# Patient Record
Sex: MALE | Race: BLACK OR AFRICAN AMERICAN | Employment: UNEMPLOYED | URBAN - METROPOLITAN AREA
[De-identification: names, ages, dates, MRNs, and addresses within clinical notes are randomized per-mention and may not be internally consistent; named-entity substitution may affect disease eponyms.]

---

## 2019-06-03 ENCOUNTER — HOSPITAL ENCOUNTER (EMERGENCY)
Age: 34
Discharge: HOME | End: 2019-06-03
Payer: MEDICAID

## 2019-06-03 ENCOUNTER — APPOINTMENT (OUTPATIENT)
Dept: CT IMAGING | Age: 34
End: 2019-06-03
Payer: MEDICAID

## 2019-06-03 VITALS
DIASTOLIC BLOOD PRESSURE: 82 MMHG | OXYGEN SATURATION: 98 % | BODY MASS INDEX: 42.48 KG/M2 | SYSTOLIC BLOOD PRESSURE: 134 MMHG | TEMPERATURE: 98.2 F | HEART RATE: 93 BPM | WEIGHT: 296.74 LBS | HEIGHT: 70 IN | RESPIRATION RATE: 16 BRPM

## 2019-06-03 DIAGNOSIS — R10.9 ABDOMINAL PAIN, UNSPECIFIED ABDOMINAL LOCATION: Primary | ICD-10-CM

## 2019-06-03 LAB
A/G RATIO: 1.2 (ref 1.1–2.2)
ALBUMIN SERPL-MCNC: 4.3 G/DL (ref 3.4–5)
ALP BLD-CCNC: 66 U/L (ref 40–129)
ALT SERPL-CCNC: 17 U/L (ref 10–40)
ANION GAP SERPL CALCULATED.3IONS-SCNC: 11 MMOL/L (ref 3–16)
AST SERPL-CCNC: 36 U/L (ref 15–37)
BASOPHILS ABSOLUTE: 0 K/UL (ref 0–0.2)
BASOPHILS RELATIVE PERCENT: 0.4 %
BILIRUB SERPL-MCNC: 0.4 MG/DL (ref 0–1)
BILIRUBIN URINE: NEGATIVE
BLOOD, URINE: ABNORMAL
BUN BLDV-MCNC: 8 MG/DL (ref 7–20)
CALCIUM SERPL-MCNC: 9.3 MG/DL (ref 8.3–10.6)
CHLORIDE BLD-SCNC: 99 MMOL/L (ref 99–110)
CLARITY: CLEAR
CO2: 23 MMOL/L (ref 21–32)
COLOR: YELLOW
CREAT SERPL-MCNC: 0.9 MG/DL (ref 0.9–1.3)
EOSINOPHILS ABSOLUTE: 0.2 K/UL (ref 0–0.6)
EOSINOPHILS RELATIVE PERCENT: 1.9 %
EPITHELIAL CELLS, UA: ABNORMAL /HPF
GFR AFRICAN AMERICAN: >60
GFR NON-AFRICAN AMERICAN: >60
GLOBULIN: 3.5 G/DL
GLUCOSE BLD-MCNC: 132 MG/DL (ref 70–99)
GLUCOSE URINE: NEGATIVE MG/DL
HCT VFR BLD CALC: 43.9 % (ref 40.5–52.5)
HEMOGLOBIN: 15.1 G/DL (ref 13.5–17.5)
KETONES, URINE: NEGATIVE MG/DL
LACTIC ACID: 1.1 MMOL/L (ref 0.4–2)
LEUKOCYTE ESTERASE, URINE: NEGATIVE
LIPASE: 19 U/L (ref 13–60)
LYMPHOCYTES ABSOLUTE: 1.2 K/UL (ref 1–5.1)
LYMPHOCYTES RELATIVE PERCENT: 12.8 %
MCH RBC QN AUTO: 33.4 PG (ref 26–34)
MCHC RBC AUTO-ENTMCNC: 34.3 G/DL (ref 31–36)
MCV RBC AUTO: 97.2 FL (ref 80–100)
MICROSCOPIC EXAMINATION: YES
MONOCYTES ABSOLUTE: 1 K/UL (ref 0–1.3)
MONOCYTES RELATIVE PERCENT: 10 %
NEUTROPHILS ABSOLUTE: 7.1 K/UL (ref 1.7–7.7)
NEUTROPHILS RELATIVE PERCENT: 74.9 %
NITRITE, URINE: NEGATIVE
PDW BLD-RTO: 13.4 % (ref 12.4–15.4)
PH UA: 6 (ref 5–8)
PLATELET # BLD: 242 K/UL (ref 135–450)
PMV BLD AUTO: 7.5 FL (ref 5–10.5)
POTASSIUM SERPL-SCNC: 3.9 MMOL/L (ref 3.5–5.1)
POTASSIUM SERPL-SCNC: 6.1 MMOL/L (ref 3.5–5.1)
PROTEIN UA: NEGATIVE MG/DL
RBC # BLD: 4.52 M/UL (ref 4.2–5.9)
RBC UA: ABNORMAL /HPF (ref 0–2)
SODIUM BLD-SCNC: 133 MMOL/L (ref 136–145)
SPECIFIC GRAVITY UA: <=1.005 (ref 1–1.03)
TOTAL PROTEIN: 7.8 G/DL (ref 6.4–8.2)
URINE REFLEX TO CULTURE: ABNORMAL
URINE TYPE: ABNORMAL
UROBILINOGEN, URINE: 0.2 E.U./DL
WBC # BLD: 9.5 K/UL (ref 4–11)
WBC UA: ABNORMAL /HPF (ref 0–5)

## 2019-06-03 PROCEDURE — 83690 ASSAY OF LIPASE: CPT

## 2019-06-03 PROCEDURE — 36415 COLL VENOUS BLD VENIPUNCTURE: CPT

## 2019-06-03 PROCEDURE — 74177 CT ABD & PELVIS W/CONTRAST: CPT

## 2019-06-03 PROCEDURE — 6360000004 HC RX CONTRAST MEDICATION: Performed by: NURSE PRACTITIONER

## 2019-06-03 PROCEDURE — 6360000002 HC RX W HCPCS: Performed by: NURSE PRACTITIONER

## 2019-06-03 PROCEDURE — 83605 ASSAY OF LACTIC ACID: CPT

## 2019-06-03 PROCEDURE — 99284 EMERGENCY DEPT VISIT MOD MDM: CPT

## 2019-06-03 PROCEDURE — 80053 COMPREHEN METABOLIC PANEL: CPT

## 2019-06-03 PROCEDURE — 2580000003 HC RX 258: Performed by: NURSE PRACTITIONER

## 2019-06-03 PROCEDURE — 96375 TX/PRO/DX INJ NEW DRUG ADDON: CPT

## 2019-06-03 PROCEDURE — 84132 ASSAY OF SERUM POTASSIUM: CPT

## 2019-06-03 PROCEDURE — 96361 HYDRATE IV INFUSION ADD-ON: CPT

## 2019-06-03 PROCEDURE — 81001 URINALYSIS AUTO W/SCOPE: CPT

## 2019-06-03 PROCEDURE — 85025 COMPLETE CBC W/AUTO DIFF WBC: CPT

## 2019-06-03 PROCEDURE — 96374 THER/PROPH/DIAG INJ IV PUSH: CPT

## 2019-06-03 RX ORDER — KETOROLAC TROMETHAMINE 30 MG/ML
15 INJECTION, SOLUTION INTRAMUSCULAR; INTRAVENOUS ONCE
Status: COMPLETED | OUTPATIENT
Start: 2019-06-03 | End: 2019-06-03

## 2019-06-03 RX ORDER — 0.9 % SODIUM CHLORIDE 0.9 %
1000 INTRAVENOUS SOLUTION INTRAVENOUS ONCE
Status: COMPLETED | OUTPATIENT
Start: 2019-06-03 | End: 2019-06-03

## 2019-06-03 RX ORDER — DICYCLOMINE HCL 20 MG
20 TABLET ORAL 4 TIMES DAILY
Qty: 30 TABLET | Refills: 0 | Status: SHIPPED | OUTPATIENT
Start: 2019-06-03 | End: 2020-09-16 | Stop reason: ALTCHOICE

## 2019-06-03 RX ORDER — NAPROXEN 500 MG/1
500 TABLET ORAL 2 TIMES DAILY
Qty: 20 TABLET | Refills: 0 | Status: SHIPPED | OUTPATIENT
Start: 2019-06-03 | End: 2020-09-16 | Stop reason: ALTCHOICE

## 2019-06-03 RX ORDER — ONDANSETRON 2 MG/ML
4 INJECTION INTRAMUSCULAR; INTRAVENOUS ONCE
Status: COMPLETED | OUTPATIENT
Start: 2019-06-03 | End: 2019-06-03

## 2019-06-03 RX ORDER — MORPHINE SULFATE 4 MG/ML
4 INJECTION, SOLUTION INTRAMUSCULAR; INTRAVENOUS ONCE
Status: COMPLETED | OUTPATIENT
Start: 2019-06-03 | End: 2019-06-03

## 2019-06-03 RX ADMIN — SODIUM CHLORIDE 1000 ML: 9 INJECTION, SOLUTION INTRAVENOUS at 16:49

## 2019-06-03 RX ADMIN — KETOROLAC TROMETHAMINE 15 MG: 30 INJECTION, SOLUTION INTRAMUSCULAR at 16:51

## 2019-06-03 RX ADMIN — IOVERSOL 100 ML: 678 INJECTION INTRA-ARTERIAL; INTRAVENOUS at 17:28

## 2019-06-03 RX ADMIN — ONDANSETRON 4 MG: 2 INJECTION INTRAMUSCULAR; INTRAVENOUS at 16:52

## 2019-06-03 RX ADMIN — MORPHINE SULFATE 4 MG: 4 INJECTION INTRAVENOUS at 16:54

## 2019-06-03 ASSESSMENT — ENCOUNTER SYMPTOMS
DIARRHEA: 1
ABDOMINAL PAIN: 1
NAUSEA: 1
RESPIRATORY NEGATIVE: 1

## 2019-06-03 ASSESSMENT — PAIN DESCRIPTION - ONSET: ONSET: PROGRESSIVE

## 2019-06-03 ASSESSMENT — PAIN SCALES - GENERAL
PAINLEVEL_OUTOF10: 8
PAINLEVEL_OUTOF10: 6
PAINLEVEL_OUTOF10: 8
PAINLEVEL_OUTOF10: 8

## 2019-06-03 ASSESSMENT — PAIN DESCRIPTION - PROGRESSION: CLINICAL_PROGRESSION: GRADUALLY WORSENING

## 2019-06-03 ASSESSMENT — PAIN DESCRIPTION - PAIN TYPE: TYPE: ACUTE PAIN

## 2019-06-03 ASSESSMENT — PAIN DESCRIPTION - ORIENTATION: ORIENTATION: RIGHT

## 2019-06-03 ASSESSMENT — PAIN - FUNCTIONAL ASSESSMENT: PAIN_FUNCTIONAL_ASSESSMENT: 0-10

## 2019-06-03 ASSESSMENT — PAIN DESCRIPTION - FREQUENCY: FREQUENCY: INTERMITTENT

## 2019-06-03 ASSESSMENT — PAIN DESCRIPTION - DESCRIPTORS: DESCRIPTORS: THROBBING;SHARP

## 2019-06-03 ASSESSMENT — PAIN DESCRIPTION - LOCATION: LOCATION: ABDOMEN

## 2019-06-03 NOTE — ED PROVIDER NOTES
[address] [address]  Dept: [address]: [phone]  eMERGENCYdEPARTMENT eNCOUnter      Pt Name: Rodger Brooke  MRN: [id number]  [place] 1985  Date of evaluation: 6/3/2019  Provider:STANISLAW Reid CNP     Evaluated by the advance practice provider    [address] [place] [address]       Chief Complaint   Patient presents with    Abdominal Pain     c/o lower abd pain past 3 days       CRITICAL CARE TIME       HISTORY OF PRESENT ILLNESS  (Location/Symptom, Timing/Onset, Context/Setting, Quality, Duration,Modifying Factors, Severity.)   Rodger Brooke is a 30 y.o. male who presents to the emergency department complaining of Center abdominal pain for 3 days now localizing to the right lower quadrant. Also has had some diarrhea. Pain is constant sharp. He worse with eating. Last ate yesterday evening turkey sandwich and potato chips. Patient reports she's been having this pain on and off since January when he was told he possibly had appendicitis but then nobody ever do anything about it. He is moved [address] [place] [address] from [place] no longer has a primary care provider. Of note patient walked into the emergency department with a bag of chipoltle food. Patient's complaining of 10 out of 10 abdominal pain. Denies testicular pain or dysuria. Last bowel movement this morning and was reported to be formed and soft. Nursing Notes were reviewedand agreed with or any disagreements were addressed in the HPI. REVIEW OF SYSTEMS    (2-9 systems for level 4, 10 or more for level 5)     Review of Systems   Constitutional: Positive for activity change. Negative for chills, diaphoresis, fatigue and fever. HENT: Negative. Respiratory: Negative. Cardiovascular: Negative. Gastrointestinal: Positive for abdominal pain, diarrhea and nausea. Genitourinary: Negative. Musculoskeletal: Negative. Skin: Negative. Non-plain film images such as CT, Ultrasound and MRI are read by the radiologist. Plain radiographic images are visualized and preliminarilyinterpreted by the emergency physician with the below findings:    Interpretation per the Radiologist below,if available at the time of this note:    CT ABDOMEN PELVIS W IV CONTRAST Additional Contrast? None   Final Result   Mild nonspecific small bowel distention left of midline in the abdomen. Ileus is favored. There is no associated wall thickening or pneumatosis      Multiple small nonspecific lymph nodes are noted without pathologic   enlargement.       No acute abnormality otherwise               LABS:  Labs Reviewed   COMPREHENSIVE METABOLIC PANEL - Abnormal; Notable for the following components:       Result Value    Sodium 133 (*)     Potassium 6.1 (*)     Glucose 132 (*)     All other components within normal limits    Narrative:     Boyd Forbes [id number],  Chemistry results called to and read back by JUAN PABLO Iyer, 06/03/2019 17:20, by  Zara Drake  Performed at:  [facility]  [address] [place], [place] [facility]   Phone [phone]   URINE RT REFLEX TO CULTURE - Abnormal; Notable for the following components:    Blood, Urine MODERATE (*)     All other components within normal limits    Narrative:     Performed at:  [facility]  [address] [place], [place] [facility]   Phone [phone]   MICROSCOPIC URINALYSIS - Abnormal; Notable for the following components:    RBC, UA 3-5 (*)     All other components within normal limits    Narrative:     Performed at:  [facility]  [address] [place], [place] [facility]   Phone [phone]   CBC WITH AUTO DIFFERENTIAL    Narrative:     Performed at:  [facility]  [address] [place], [place] [facility]   Phone [phone]   LIPASE Narrative:     Krzysztof Smith [id number],  Chemistry results called to and read back by JUAN PABLO Rod, 06/03/2019 17:20, by  [facility]  Performed at:  [facility]  [address] [place], [facility]   Phone [phone]   LACTIC ACID, PLASMA    Narrative:     Performed at:  [facility]  [address] [place], [facility]   Phone [phone]   POTASSIUM    Narrative:     Performed at:  [address] [place] [address] Laboratory  [address], [facility]   Phone [phone]       All other labs were within normal range or not returned as of this dictation. EMERGENCY DEPARTMENT COURSE and DIFFERENTIAL DIAGNOSIS/MDM:   Vitals:    Vitals:    06/03/19 1559 06/03/19 1724   BP: (!) 143/74 134/82   Pulse: 74 93   Resp: 16 16   Temp: 98.2 °F (36.8 °C)    TempSrc: Oral    SpO2: 98% 98%   Weight: 296 lb 11.8 oz (134.6 kg)    Height: 5' 10\" (1.778 m)      Medications   ketorolac (TORADOL) injection 15 mg (15 mg Intravenous Given 6/3/19 1651)   ondansetron (ZOFRAN) injection 4 mg (4 mg Intravenous Given 6/3/19 1652)   morphine (PF) injection 4 mg (4 mg Intravenous Given 6/3/19 1654)   0.9 % sodium chloride bolus (0 mLs Intravenous Stopped 6/3/19 1852)   ioversol (OPTIRAY) 68 % injection 100 mL (100 mLs Intravenous Given 6/3/19 1728)       MDM  Patient was seen and evaluated per myself. Dr. Luis A Mckay present available for consultation as needed. Differential diagnosis: Appendicitis, gastritis, enteritis, colitis, IBS      Urinalysis positive for moderate blood however the microscopic indicates 3-5 red blood cells. No evidence of infection. CBC is negative for leukocytosis. Chemistry profile indicates a hemolyzed potassium level VI. 1. No evidence of renal failure or electrolyte arrangement. No evidence of metabolic acidosis. Negative for transaminitis. Liver enzymes are unremarkable.   Lactic [facility] [address]  [phone]    As needed, If symptoms worsen      DISCHARGE MEDICATIONS:  Discharge Medication List as of 6/3/2019  6:48 PM      START taking these medications    Details   dicyclomine (BENTYL) 20 MG tablet Take 1 tablet by mouth 4 times daily for 5 days, Disp-30 tablet, R-0Print             (Please note that portions of this note were completed with a voice recognition program.  Efforts were made to edit the dictations but occasionally words are mis-transcribed.)    STANISLAW Mac CNP, APRN - CNP  06/03/19 2016

## 2020-09-16 ENCOUNTER — HOSPITAL ENCOUNTER (EMERGENCY)
Age: 35
Discharge: HOME | End: 2020-09-16
Attending: EMERGENCY MEDICINE
Payer: MEDICAID

## 2020-09-16 VITALS
RESPIRATION RATE: 16 BRPM | HEART RATE: 73 BPM | TEMPERATURE: 98.5 F | WEIGHT: 260 LBS | SYSTOLIC BLOOD PRESSURE: 148 MMHG | OXYGEN SATURATION: 97 % | HEIGHT: 70 IN | DIASTOLIC BLOOD PRESSURE: 85 MMHG | BODY MASS INDEX: 37.22 KG/M2

## 2020-09-16 PROCEDURE — 99282 EMERGENCY DEPT VISIT SF MDM: CPT

## 2020-09-16 PROCEDURE — 6370000000 HC RX 637 (ALT 250 FOR IP): Performed by: EMERGENCY MEDICINE

## 2020-09-16 RX ORDER — HYDROCODONE BITARTRATE AND ACETAMINOPHEN 5; 325 MG/1; MG/1
1 TABLET ORAL EVERY 6 HOURS PRN
Qty: 10 TABLET | Refills: 0 | Status: SHIPPED | OUTPATIENT
Start: 2020-09-16 | End: 2020-09-17

## 2020-09-16 RX ORDER — OXYCODONE HYDROCHLORIDE AND ACETAMINOPHEN 5; 325 MG/1; MG/1
1 TABLET ORAL ONCE
Status: COMPLETED | OUTPATIENT
Start: 2020-09-16 | End: 2020-09-16

## 2020-09-16 RX ORDER — NAPROXEN 500 MG/1
500 TABLET ORAL 2 TIMES DAILY WITH MEALS
Qty: 30 TABLET | Refills: 0 | Status: SHIPPED | OUTPATIENT
Start: 2020-09-16 | End: 2020-10-18 | Stop reason: ALTCHOICE

## 2020-09-16 RX ORDER — PENICILLIN V POTASSIUM 500 MG/1
500 TABLET ORAL 4 TIMES DAILY
Qty: 28 TABLET | Refills: 0 | Status: SHIPPED | OUTPATIENT
Start: 2020-09-16 | End: 2020-09-23

## 2020-09-16 RX ADMIN — OXYCODONE HYDROCHLORIDE AND ACETAMINOPHEN 1 TABLET: 5; 325 TABLET ORAL at 13:48

## 2020-09-16 ASSESSMENT — PAIN DESCRIPTION - LOCATION: LOCATION: TEETH

## 2020-09-16 ASSESSMENT — PAIN SCALES - GENERAL: PAINLEVEL_OUTOF10: 9

## 2020-09-16 ASSESSMENT — PAIN DESCRIPTION - ORIENTATION: ORIENTATION: RIGHT;LOWER

## 2020-09-16 ASSESSMENT — PAIN DESCRIPTION - PAIN TYPE: TYPE: ACUTE PAIN

## 2020-09-16 NOTE — ED NOTES
Patient prepared for and ready to be discharged. Dressed in clothes and given belongings. Patient discharged at this time in no acute distress after he verbalized understanding of discharge instructions. Reviewed medications, and when to return to the ED with patient. Encouraged follow up with PCP/dentist  Patient walked to [place], Family to take patient home.        Yvonne Barraza RN  09/16/20 8390

## 2020-09-16 NOTE — ED PROVIDER NOTES
Avenue on Monday as planned. Clinical Impression     1. Dental abscess    2. Dental caries        Disposition     PATIENT REFERRED TO:  your dentist            DISCHARGE MEDICATIONS:  New Prescriptions    HYDROCODONE-ACETAMINOPHEN (NORCO) 5-325 MG PER TABLET    Take 1 tablet by mouth every 6 hours as needed for Pain for up to 10 doses. WARNING:  May cause drowsiness. May impair ability to operate vehicles or machinery. Do not use in combination with alcohol.     NAPROXEN (NAPROSYN) 500 MG TABLET    Take 1 tablet by mouth 2 times daily (with meals) for 30 doses    PENICILLIN V POTASSIUM (VEETID) 500 MG TABLET    Take 1 tablet by mouth 4 times daily for 7 days       DISPOSITION Discharge - Pending Orders Complete 09/16/2020 01:41:45 PM        Shlomo Severance, MD  09/16/20 2654

## 2020-10-14 ENCOUNTER — HOSPITAL ENCOUNTER (EMERGENCY)
Age: 35
Discharge: HOME | End: 2020-10-14
Attending: EMERGENCY MEDICINE
Payer: MEDICAID

## 2020-10-14 VITALS
HEART RATE: 82 BPM | RESPIRATION RATE: 18 BRPM | OXYGEN SATURATION: 100 % | SYSTOLIC BLOOD PRESSURE: 162 MMHG | TEMPERATURE: 98.1 F | DIASTOLIC BLOOD PRESSURE: 87 MMHG

## 2020-10-14 PROCEDURE — 99282 EMERGENCY DEPT VISIT SF MDM: CPT

## 2020-10-14 PROCEDURE — 6360000002 HC RX W HCPCS: Performed by: STUDENT IN AN ORGANIZED HEALTH CARE EDUCATION/TRAINING PROGRAM

## 2020-10-14 PROCEDURE — 96372 THER/PROPH/DIAG INJ SC/IM: CPT

## 2020-10-14 PROCEDURE — 64450 NJX AA&/STRD OTHER PN/BRANCH: CPT

## 2020-10-14 PROCEDURE — 2500000003 HC RX 250 WO HCPCS: Performed by: STUDENT IN AN ORGANIZED HEALTH CARE EDUCATION/TRAINING PROGRAM

## 2020-10-14 RX ORDER — KETOROLAC TROMETHAMINE 30 MG/ML
15 INJECTION, SOLUTION INTRAMUSCULAR; INTRAVENOUS ONCE
Status: COMPLETED | OUTPATIENT
Start: 2020-10-14 | End: 2020-10-14

## 2020-10-14 RX ORDER — PENICILLIN V POTASSIUM 500 MG/1
500 TABLET ORAL 4 TIMES DAILY
Qty: 28 TABLET | Refills: 0 | Status: SHIPPED | OUTPATIENT
Start: 2020-10-14 | End: 2020-10-18 | Stop reason: ALTCHOICE

## 2020-10-14 RX ORDER — BUPIVACAINE HYDROCHLORIDE 2.5 MG/ML
30 INJECTION, SOLUTION EPIDURAL; INFILTRATION; INTRACAUDAL ONCE
Status: COMPLETED | OUTPATIENT
Start: 2020-10-14 | End: 2020-10-14

## 2020-10-14 RX ADMIN — KETOROLAC TROMETHAMINE 15 MG: 30 INJECTION, SOLUTION INTRAMUSCULAR at 21:14

## 2020-10-14 ASSESSMENT — PAIN DESCRIPTION - ONSET: ONSET: GRADUAL

## 2020-10-14 ASSESSMENT — PAIN - FUNCTIONAL ASSESSMENT: PAIN_FUNCTIONAL_ASSESSMENT: PREVENTS OR INTERFERES SOME ACTIVE ACTIVITIES AND ADLS

## 2020-10-14 ASSESSMENT — ENCOUNTER SYMPTOMS
VOICE CHANGE: 0
COUGH: 0
NAUSEA: 0
VOMITING: 0
EYE PAIN: 0
SHORTNESS OF BREATH: 0

## 2020-10-14 ASSESSMENT — PAIN DESCRIPTION - ORIENTATION: ORIENTATION: RIGHT

## 2020-10-14 ASSESSMENT — PAIN DESCRIPTION - DESCRIPTORS: DESCRIPTORS: SHARP;THROBBING

## 2020-10-14 ASSESSMENT — PAIN DESCRIPTION - FREQUENCY: FREQUENCY: CONTINUOUS

## 2020-10-14 ASSESSMENT — PAIN SCALES - GENERAL
PAINLEVEL_OUTOF10: 9
PAINLEVEL_OUTOF10: 9

## 2020-10-14 ASSESSMENT — PAIN DESCRIPTION - PAIN TYPE: TYPE: ACUTE PAIN

## 2020-10-14 ASSESSMENT — PAIN DESCRIPTION - LOCATION: LOCATION: TEETH

## 2020-10-14 ASSESSMENT — PAIN DESCRIPTION - PROGRESSION: CLINICAL_PROGRESSION: GRADUALLY WORSENING

## 2020-10-15 NOTE — ED NOTES
Patient discharged to home alone. Patient verbalized understanding of discharge instructions. Advised of when to return to ED and when to call 911. Advised of follow up with PCP. Patient received 1 Rx with education. Patient verbalized understanding of education. No questions from patient to this RN. Patient left ED without incident.       Darin Diego RN  10/14/20 9487

## 2020-10-15 NOTE — ED PROVIDER NOTES
ED Attending Attestation Note     Date of evaluation: 10/14/2020    This patient was seen by the resident. I have seen and examined the patient, agree with the workup, evaluation, management and diagnosis. The care plan has been discussed. I was present for the procedure performed in the resident's  note and have made edits to the note where appropriate. My assessment reveals 37 y.o. male with history of chronic dental pain presenting for right-sided dental pain at tooth #32. Has a fracture of the posterior lateral aspect without evidence of infection or abscess. Dental block provided, though the patient tolerated this poorly and he did not receive a full amount of bupivacaine. Will provide dental resources and penicillin.          Tomy Erickson MD  10/14/20 4732

## 2020-10-15 NOTE — ED TRIAGE NOTES
Pt came in for concerns of an infected tooth that he states he cant see dentist until next Thursday.

## 2020-10-15 NOTE — ED PROVIDER NOTES
pack-year smoking history. He uses smokeless tobacco. He reports that he does not drink alcohol or use drugs. Medications     Previous Medications    ALBUTEROL SULFATE HFA (PROVENTIL HFA) 108 (90 BASE) MCG/ACT INHALER    Inhale 2 puffs into the lungs every 4 hours as needed for Wheezing or Shortness of Breath (Space out to every 6 hours as symptoms improve) Space out to every 6 hours as symptoms improve. NAPROXEN (NAPROSYN) 500 MG TABLET    Take 1 tablet by mouth 2 times daily (with meals) for 30 doses       Allergies     He is allergic to sulfa antibiotics. Physical Exam     INITIAL VITALS: BP: (!) 162/87, Temp: 98.1 °F (36.7 °C), Pulse: 82, Resp: 18, SpO2: 100 %   Physical Exam  Constitutional:       General: He is not in acute distress. Appearance: Normal appearance. He is obese. Comments: Appears uncomfortable but nontoxic. HENT:      Head: Normocephalic and atraumatic. Right Ear: External ear normal.      Left Ear: External ear normal.      Nose: Nose normal.      Mouth/Throat:      Mouth: Mucous membranes are moist.      Pharynx: Oropharynx is clear. No oropharyngeal exudate. Comments: Poor dentition. Chipped right lower molar with tenderness to palpation. No areas of fluctuance along gumline. Floor the mouth is soft. Uvula midline. Handling secretions. No facial swelling. No trismus. Eyes:      Conjunctiva/sclera: Conjunctivae normal.   Neck:      Musculoskeletal: Neck supple. No neck rigidity or muscular tenderness. Pulmonary:      Effort: Pulmonary effort is normal. No respiratory distress. Musculoskeletal:         General: No swelling or deformity. Skin:     General: Skin is warm and dry. Findings: No rash. Neurological:      General: No focal deficit present. Mental Status: He is alert.    Psychiatric:         Behavior: Behavior normal.           DiagnosticResults       RADIOLOGY:  No orders to display       LABS:   No results found for this visit on 10/14/20. ED BEDSIDE ULTRASOUND:  None    RECENT VITALS:  BP: (!) 162/87, Temp: 98.1 °F (36.7 °C), Pulse: 82,Resp: 18, SpO2: 100 %     Procedures     Dental Nerve Block    Date/Time: 10/14/2020 9:35 PM  Performed by: Silvia Akins MD  Authorized by: Mayela Izaguirre MD     Consent:     Consent obtained:  Verbal    Consent given by:  Patient  Indications:     Indications: dental pain    Location:     Block type: Inferior alveolar    Laterality:  Right  Procedure details (see MAR for exact dosages): Anesthetic injected:  Bupivacaine 0.5% WITH epi    Injection procedure:  Anatomic landmarks identified, anatomic landmarks palpated, introduced needle, negative aspiration for blood and incremental injection  Post-procedure details:     Outcome:  Pain improved    Patient tolerance of procedure: Tolerated well, no immediate complications        ED Course     Nursing Notes, Past Medical Hx, Past Surgical Hx, Social Hx, Allergies, and Family Hx were reviewed. The patient was given the followingmedications:  Orders Placed This Encounter   Medications    ketorolac (TORADOL) injection 15 mg    bupivacaine (PF) (MARCAINE) 0.25 % injection 75 mg    penicillin v potassium (VEETID) 500 MG tablet     Sig: Take 1 tablet by mouth 4 times daily for 7 days     Dispense:  28 tablet     Refill:  0       CONSULTS:  None    MEDICAL DECISION MAKING / ASSESSMENT / Alysa Loraine is a 37 y.o. male who presents for right lower molar dental pain over the past couple of weeks. On initial assessment, the patient appears uncomfortable but nontoxic. He is afebrile and hemodynamically stable, though hypertensive with a blood pressure of 160s over 80s. Physical exam is notable for overall poor dentition with a chipped right lower molar. He has no obvious areas of fluctuance that would be concerning for periapical abscess.   Floor the mouth is soft without obvious facial swelling, uvula is midline, he is handling his

## 2020-10-18 ENCOUNTER — HOSPITAL ENCOUNTER (EMERGENCY)
Age: 35
Discharge: HOME | End: 2020-10-18
Payer: MEDICAID

## 2020-10-18 VITALS
OXYGEN SATURATION: 100 % | TEMPERATURE: 98.5 F | RESPIRATION RATE: 18 BRPM | SYSTOLIC BLOOD PRESSURE: 141 MMHG | HEIGHT: 71 IN | HEART RATE: 69 BPM | BODY MASS INDEX: 40.43 KG/M2 | WEIGHT: 288.8 LBS | DIASTOLIC BLOOD PRESSURE: 89 MMHG

## 2020-10-18 PROCEDURE — 6370000000 HC RX 637 (ALT 250 FOR IP): Performed by: PHYSICIAN ASSISTANT

## 2020-10-18 PROCEDURE — 99282 EMERGENCY DEPT VISIT SF MDM: CPT

## 2020-10-18 RX ORDER — OXYCODONE HYDROCHLORIDE AND ACETAMINOPHEN 5; 325 MG/1; MG/1
1 TABLET ORAL EVERY 6 HOURS PRN
Qty: 8 TABLET | Refills: 0 | Status: SHIPPED | OUTPATIENT
Start: 2020-10-18 | End: 2020-10-21

## 2020-10-18 RX ORDER — PENICILLIN V POTASSIUM 500 MG/1
500 TABLET ORAL 4 TIMES DAILY
Qty: 28 TABLET | Refills: 0 | Status: SHIPPED | OUTPATIENT
Start: 2020-10-18 | End: 2020-10-25

## 2020-10-18 RX ORDER — OXYCODONE HYDROCHLORIDE AND ACETAMINOPHEN 5; 325 MG/1; MG/1
2 TABLET ORAL ONCE
Status: COMPLETED | OUTPATIENT
Start: 2020-10-18 | End: 2020-10-18

## 2020-10-18 RX ORDER — IBUPROFEN 600 MG/1
600 TABLET ORAL
Qty: 21 TABLET | Refills: 0 | Status: SHIPPED | OUTPATIENT
Start: 2020-10-18 | End: 2021-04-10 | Stop reason: ALTCHOICE

## 2020-10-18 RX ORDER — ONDANSETRON 4 MG/1
4-8 TABLET, ORALLY DISINTEGRATING ORAL EVERY 8 HOURS PRN
Qty: 12 TABLET | Refills: 0 | Status: SHIPPED | OUTPATIENT
Start: 2020-10-18 | End: 2022-01-24

## 2020-10-18 RX ADMIN — IBUPROFEN 600 MG: 200 TABLET, FILM COATED ORAL at 17:00

## 2020-10-18 RX ADMIN — OXYCODONE HYDROCHLORIDE AND ACETAMINOPHEN 2 TABLET: 5; 325 TABLET ORAL at 16:59

## 2020-10-18 ASSESSMENT — PAIN DESCRIPTION - ORIENTATION: ORIENTATION: LOWER;RIGHT

## 2020-10-18 ASSESSMENT — PAIN DESCRIPTION - PROGRESSION: CLINICAL_PROGRESSION: GRADUALLY WORSENING

## 2020-10-18 ASSESSMENT — PAIN SCALES - GENERAL
PAINLEVEL_OUTOF10: 4
PAINLEVEL_OUTOF10: 4
PAINLEVEL_OUTOF10: 10

## 2020-10-18 ASSESSMENT — PAIN - FUNCTIONAL ASSESSMENT
PAIN_FUNCTIONAL_ASSESSMENT: 0-10
PAIN_FUNCTIONAL_ASSESSMENT: PREVENTS OR INTERFERES SOME ACTIVE ACTIVITIES AND ADLS

## 2020-10-18 ASSESSMENT — PAIN DESCRIPTION - FREQUENCY: FREQUENCY: CONTINUOUS

## 2020-10-18 ASSESSMENT — PAIN DESCRIPTION - LOCATION: LOCATION: TEETH

## 2020-10-18 ASSESSMENT — PAIN DESCRIPTION - PAIN TYPE: TYPE: ACUTE PAIN

## 2020-10-18 ASSESSMENT — PAIN DESCRIPTION - DESCRIPTORS: DESCRIPTORS: THROBBING

## 2020-10-18 NOTE — ED NOTES
D/C: Order noted for d/c. Pt confirmed d/c paperwork and rxs have correct name. Discharge and education instructions reviewed with patient. Teach-back successful. Pt verbalized understanding and signed d/c papers. Pt denied questions at this time. No acute distress noted. Patient instructed to follow-up as noted - return to emergency department if symptoms worsen. Patient verbalized understanding. Discharged per EDMD with discharge instructions. Pt discharged to private vehicle. Patient stable upon departure. Thanked patient for choosing [facility] for care. Provider aware of patient pain at time of discharge.      Michaela Rodriguez RN  10/18/20 5470

## 2020-10-19 NOTE — ED PROVIDER NOTES
[address] [place] [address]        Pt Name: Aria Aquino  MRN: [id number]  [place] 1985  Date of evaluation: 10/18/2020  Provider: Veronika Rodriguez PA-C  PCP: [facility]    VIK. I have evaluated this patient. My supervising physician was available for consultation. Bety Tatum, [facility]       Chief Complaint   Patient presents with    Dental Pain     patient states tooth pulled this morning.  c/o worsening pain. to ED for eval       HISTORY OF PRESENT ILLNESS   (Location, Timing/Onset, Context/Setting, Quality, Duration, Modifying Factors, Severity, Associated Signs and Symptoms)  Note limiting factors. Aria Aquino is a 37 y.o. male patient presenting with right lower jaw dental pain. Patient dental extraction appears to be tooth #19. States tooth broke and he had to cut and remove the tooth fragment with suture in place. Patient presenting with rather moderate acute pain. Presenting for evaluation and pain control. He has had teeth pulled in the past but none with this much pain. Nursing Notes were all reviewed and agreed with or any disagreements were addressed in the HPI. REVIEW OF SYSTEMS    (2-9 systems for level 4, 10 or more for level 5)     Review of Systems    Positives and Pertinent negatives as per HPI. Except as noted above in the ROS, all other systems were reviewed and negative.        PAST MEDICAL HISTORY     Past Medical History:   Diagnosis Date    Asthma     Kidney stone     Manic depression ([facility] [address]     Meningitis     April 2011 - treated at [address] [place] [address]     Past Surgical History:   Procedure Laterality Date    FRACTURE SURGERY           CURRENTMEDICATIONS       Discharge Medication List as of 10/18/2020  6:32 PM      CONTINUE these medications which have NOT CHANGED    Details   albuterol sulfate HFA (PROVENTIL HFA) 108 (90 BASE) MCG/ACT inhaler Inhale 2 puffs into the lungs every 4 hours as needed for Wheezing or Shortness of Breath (Space out to every 6 hours as symptoms improve) Space out to every 6 hours as symptoms improve., Disp-1 Inhaler, R-0               ALLERGIES     Sulfa antibiotics    FAMILYHISTORY     History reviewed. No pertinent family history. SOCIAL HISTORY       Social History     Tobacco Use    Smoking status: Current Every Day Smoker     Packs/day: 0.50     Years: 3.00     Pack years: 1.50     Types: Cigarettes    Smokeless tobacco: Current User   Substance Use Topics    Alcohol use: No    Drug use: No       SCREENINGS             PHYSICAL EXAM    (up to 7 for level 4, 8 or more for level 5)     ED Triage Vitals [10/18/20 1538]   BP Temp Temp Source Pulse Resp SpO2 Height Weight   (!) 152/91 98.9 °F (37.2 °C) Temporal 68 18 100 % 5' 11\" (1.803 m) 288 lb 12.8 oz (131 kg)       Physical Exam  Vitals signs and nursing note reviewed. Constitutional:       Appearance: Normal appearance. He is well-developed. He is obese. HENT:      Head: Normocephalic and atraumatic. Right Ear: External ear normal.      Left Ear: External ear normal.      Mouth/Throat:      Comments: Patient with tenderness and without instability and with normal oral/dental occlusion. Patient has what it should be some blood seepage at the extraction site. Suture in place. Patient rather tender in the area. No unusual swelling at this time. Eyes:      General: No scleral icterus. Right eye: No discharge. Left eye: No discharge. Conjunctiva/sclera: Conjunctivae normal.   Neck:      Musculoskeletal: Normal range of motion and neck supple. Cardiovascular:      Rate and Rhythm: Normal rate and regular rhythm. Heart sounds: Normal heart sounds. Pulmonary:      Effort: Pulmonary effort is normal.      Breath sounds: Normal breath sounds. Musculoskeletal: Normal range of motion. Skin:     General: Skin is warm and dry. Neurological:      General: No focal deficit present. Mental Status: He is alert and oriented to person, place, and time. Mental status is at baseline. Psychiatric:         Mood and Affect: Mood normal.         Behavior: Behavior normal.         Thought Content: Thought content normal.         Judgment: Judgment normal.         DIAGNOSTIC RESULTS   LABS:    Labs Reviewed - No data to display    All other labs were within normal range or not returned as of this dictation. EKG: All EKG's are interpreted by the Emergency Department Physician in the absence of a cardiologist.  Please see their note for interpretation of EKG. RADIOLOGY:   Non-plain film images such as CT, Ultrasound and MRI are read by the radiologist. Plain radiographic images are visualized and preliminarily interpreted by the ED Provider with the below findings:        Interpretation per the Radiologist below, if available at the time of this note:    No orders to display     No results found. PROCEDURES   Unless otherwise noted below, none     Procedures    CRITICAL CARE TIME   N/A    CONSULTS:  None      EMERGENCY DEPARTMENT COURSE and DIFFERENTIAL DIAGNOSIS/MDM:   Vitals:    Vitals:    10/18/20 1538 10/18/20 1840   BP: (!) 152/91 (!) 141/89   Pulse: 68 69   Resp: 18 18   Temp: 98.9 °F (37.2 °C) 98.5 °F (36.9 °C)   TempSrc: Temporal    SpO2: 100% 100%   Weight: 288 lb 12.8 oz (131 kg)    Height: 5' 11\" (1.803 m)        Patient was given the following medications:  Medications   ibuprofen (ADVIL;MOTRIN) tablet 600 mg (600 mg Oral Given 10/18/20 1700)   oxyCODONE-acetaminophen (PERCOCET) 5-325 MG per tablet 2 tablet (2 tablets Oral Given 10/18/20 1659)           With postop/tooth extraction dental pain. He is given Percocet 5 mg #2 and ibuprofen 600 mg. The patient feeling improved and able to be discharged home. Laboratory studies not obtained. Patient be sent home with Zofran, penicillin, Percocet and ibuprofen.   Ice to the area recommended. Follow with dental in the morning. If patient continues with pain or worsening pain he is to return to the department for further evaluation and with consideration of CT mandible. The patient does express understanding of diagnosis and the treatment plan. FINAL IMPRESSION      1. Pain, dental    2. S/P tooth extraction          DISPOSITION/PLAN   DISPOSITION Decision To Discharge 10/18/2020 06:25:26 PM      PATIENT REFERREDTO:  Your dentist    Schedule an appointment as soon as possible for a visit in 1 day      [address] [facility]    Schedule an appointment as soon as possible for a visit   As needed    [facility] Emergency Department  [address] [id number]  [phone]  Go to   If symptoms worsen      DISCHARGE MEDICATIONS:  Discharge Medication List as of 10/18/2020  6:32 PM      START taking these medications    Details   ondansetron (ZOFRAN ODT) 4 MG disintegrating tablet Take 1-2 tablets by mouth every 8 hours as needed for Nausea or Vomiting, Disp-12 tablet,R-0Print      penicillin v potassium (VEETID) 500 MG tablet Take 1 tablet by mouth 4 times daily for 7 days, Disp-28 tablet,R-0Print      oxyCODONE-acetaminophen (PERCOCET) 5-325 MG per tablet Take 1 tablet by mouth every 6 hours as needed for Pain for up to 3 days. WARNING:  May cause drowsiness. May impair ability to operate vehicles or machinery. Do not use in combination with alcohol., Disp-8 tablet,R-0Print      ibuprofen (ADVIL;MOTRIN) 600 MG tablet Take 1 tablet by mouth 3 times daily (with meals) for 7 days, Disp-21 tablet,R-0Print             DISCONTINUED MEDICATIONS:  Discharge Medication List as of 10/18/2020  6:32 PM            Periodic Controlled Substance Monitoring: No signs of potential drug abuse or diversion identified.  Matilde Saavedra PA-C)    (Please note that portions of this note were completed with a voice recognition program.  Efforts were made to edit the dictations but occasionally words are mis-transcribed. )    Isabel Mooney PA-C (electronically signed)           Isabel Mooney PA-C  10/19/20 1107

## 2020-12-28 ENCOUNTER — HOSPITAL ENCOUNTER (EMERGENCY)
Age: 35
Discharge: HOME | End: 2020-12-29
Attending: EMERGENCY MEDICINE
Payer: MEDICAID

## 2020-12-28 ENCOUNTER — APPOINTMENT (OUTPATIENT)
Dept: GENERAL RADIOLOGY | Age: 35
End: 2020-12-28
Payer: MEDICAID

## 2020-12-28 PROCEDURE — 6370000000 HC RX 637 (ALT 250 FOR IP): Performed by: PODIATRIST

## 2020-12-28 PROCEDURE — 99283 EMERGENCY DEPT VISIT LOW MDM: CPT

## 2020-12-28 PROCEDURE — 73562 X-RAY EXAM OF KNEE 3: CPT

## 2020-12-28 RX ORDER — OXYCODONE HYDROCHLORIDE AND ACETAMINOPHEN 5; 325 MG/1; MG/1
1 TABLET ORAL ONCE
Status: COMPLETED | OUTPATIENT
Start: 2020-12-28 | End: 2020-12-28

## 2020-12-28 RX ADMIN — OXYCODONE HYDROCHLORIDE AND ACETAMINOPHEN 1 TABLET: 5; 325 TABLET ORAL at 22:51

## 2020-12-28 ASSESSMENT — PAIN DESCRIPTION - PROGRESSION
CLINICAL_PROGRESSION: GRADUALLY WORSENING
CLINICAL_PROGRESSION: OTHER (COMMENT)

## 2020-12-28 ASSESSMENT — PAIN DESCRIPTION - FREQUENCY: FREQUENCY: CONTINUOUS

## 2020-12-28 ASSESSMENT — PAIN DESCRIPTION - ONSET: ONSET: ON-GOING

## 2020-12-28 ASSESSMENT — PAIN DESCRIPTION - LOCATION: LOCATION: KNEE

## 2020-12-28 ASSESSMENT — PAIN DESCRIPTION - DESCRIPTORS: DESCRIPTORS: SHOOTING;STABBING

## 2020-12-28 ASSESSMENT — ENCOUNTER SYMPTOMS
RESPIRATORY NEGATIVE: 1
VOMITING: 0
NAUSEA: 0
DIARRHEA: 0
CHEST TIGHTNESS: 0
ALLERGIC/IMMUNOLOGIC NEGATIVE: 1
SHORTNESS OF BREATH: 0
CONSTIPATION: 0
ABDOMINAL PAIN: 0
GASTROINTESTINAL NEGATIVE: 1
EYES NEGATIVE: 1

## 2020-12-28 ASSESSMENT — PAIN SCALES - WONG BAKER: WONGBAKER_NUMERICALRESPONSE: 10

## 2020-12-28 ASSESSMENT — PAIN SCALES - GENERAL
PAINLEVEL_OUTOF10: 10
PAINLEVEL_OUTOF10: 10

## 2020-12-28 ASSESSMENT — PAIN DESCRIPTION - ORIENTATION: ORIENTATION: RIGHT

## 2020-12-28 ASSESSMENT — PAIN DESCRIPTION - PAIN TYPE: TYPE: ACUTE PAIN

## 2020-12-29 VITALS
HEART RATE: 79 BPM | RESPIRATION RATE: 17 BRPM | TEMPERATURE: 98.4 F | SYSTOLIC BLOOD PRESSURE: 152 MMHG | OXYGEN SATURATION: 100 % | DIASTOLIC BLOOD PRESSURE: 82 MMHG

## 2020-12-29 NOTE — ED PROVIDER NOTES
[address] [facility] RESIDENT NOTE       Date of evaluation: 12/28/2020    Chief Complaint     Knee Pain      History of Present Illness     Sandip Hsu is a 37 y.o. male who presents to the emergency department with complaint of right knee pain. He reports falling down the stairs while helping a friend move a few weeks ago and felt his knee twist in an unorthodox position. He denies any other injuries, specifically denies head injury, loss of consciousness, back injury. He has been seen by outside orthopedic surgeon of which he does not know the physician's name. The physician ordered MRI which he completed today, however her next available appointment for him would be this coming Friday or the following Monday. Patient reports the pain is too severe for him to wait that long. He states the pain is 10/10 on a pain scale and describes the pain is burning in nature. Patient has tried ibuprofen, icy hot, ice to the area with minimal to no relief. Patient is able to walk with a limp, and has been using a knee brace. He denies any other symptoms at this time. Review of Systems     Review of Systems   Constitutional: Negative. Negative for chills, fatigue and fever. HENT: Negative. Eyes: Negative. Respiratory: Negative. Negative for chest tightness and shortness of breath. Cardiovascular: Negative. Negative for chest pain. Gastrointestinal: Negative. Negative for abdominal pain, constipation, diarrhea, nausea and vomiting. Endocrine: Negative. Genitourinary: Negative. Musculoskeletal: Positive for arthralgias and joint swelling. Skin: Negative. Negative for wound. Allergic/Immunologic: Negative. Neurological: Negative. Negative for dizziness, weakness, numbness and headaches. Psychiatric/Behavioral: Negative.         Past Medical, Surgical, Family, and Social History     He has a past medical history of Asthma, Kidney stone, Manic RADIOLOGY:  XR KNEE RIGHT (3 VIEWS)   Final Result          LABS:   No results found for this visit on 12/28/20. RECENT VITALS:  BP: (!) 193/102, Temp: 98.4 °F (36.9 °C),Pulse: 90, Resp: 17, SpO2: 100 %     Procedures       ED Course     Nursing Notes, Past Medical Hx, Past Surgical Hx, Social Hx, Allergies, and FamilyHx were reviewed. The patient was giventhe following medications:  Orders Placed This Encounter   Medications    oxyCODONE-acetaminophen (PERCOCET) 5-325 MG per tablet 1 tablet       CONSULTS:  None    JUANY Carrillo / EL / Neymar Danya is a 37 y.o. male presented to the emergency department with complaint of right knee pain following a mechanical fall approximately 3 weeks ago. Patient has been seen by orthopedic surgeon who ordered an MRI which she had completed today. Patient reports he has never had x-rays performed on his right knee. On arrival, patient is well-appearing and in no acute distress. Patient is hypertensive otherwise vital signs stable. My examination reveals diffuse tenderness to the right knee with no specific bony tenderness. The right knee is stable to testing. XR of right knee reveals no acute fracture dislocation. Per chart review, I was able to see the MRI read, however unable to physically view the MRI myself. The official MRI read read as large dissecting ganglionic cysts or parameniscal cyst located along the medial and posterior aspect of the knee. The patient was given Percocet 5 mg for pain control while in the emergency department. Patient has been able to ambulate. Upon reevaluation, the patient reported improvement in his symptoms at this time he was deemed stable for discharge. I advised patient to follow-up with his orthopedic surgeon for further evaluation of his right knee. I advised him to continue icing and elevating the knee and taking over-the-counter anti-inflammatories as directed.   I instructed the patient to return to the emergency department if he experiences worsening symptoms or he is unable to walk on his right knee. I instructed the patient to not operate heavy machinery or drive until tomorrow because he was given pain medication while in the emergency department, patient understood and reports he was driven here via a friend. Patient was discharged in stable condition with written and verbal discharge instructions. This patient was also evaluated by the attending physician. All care plans were discussed and agreed upon. Clinical Impression     1.  Right knee pain, unspecified chronicity        Disposition     PATIENT REFERRED TO:  Please follow up with your orthopedic surgeon            DISCHARGE MEDICATIONS:  New Prescriptions    No medications on file       Isaac Clark DPM  Resident  12/28/20 0177

## 2020-12-29 NOTE — ED PROVIDER NOTES
Patient seen and examined discussed with resident agree with plan. Meryle Mares had a right knee injury a couple of weeks ago and denies any recent history of trauma. He comes in for increasing knee pain over the past few days. On exam he has a small effusion and he has medial knee tenderness but no real point bony tenderness.      Ksenia Arizmendi MD  12/28/20 7350

## 2020-12-29 NOTE — ED TRIAGE NOTES
Possible torn meniscus a couple weeks ago. MRI today to confirm. MD that ordered the MRI is unavailable until next week. Pain has been increasing. Able to walk with a notable limp.

## 2021-04-10 ENCOUNTER — APPOINTMENT (OUTPATIENT)
Dept: GENERAL RADIOLOGY | Age: 36
End: 2021-04-10
Payer: MEDICAID

## 2021-04-10 ENCOUNTER — HOSPITAL ENCOUNTER (EMERGENCY)
Age: 36
Discharge: HOME | End: 2021-04-10
Attending: EMERGENCY MEDICINE
Payer: MEDICAID

## 2021-04-10 VITALS
BODY MASS INDEX: 37.94 KG/M2 | SYSTOLIC BLOOD PRESSURE: 161 MMHG | WEIGHT: 265 LBS | TEMPERATURE: 98.1 F | DIASTOLIC BLOOD PRESSURE: 87 MMHG | RESPIRATION RATE: 18 BRPM | OXYGEN SATURATION: 100 % | HEART RATE: 75 BPM | HEIGHT: 70 IN

## 2021-04-10 DIAGNOSIS — M25.561 RIGHT KNEE PAIN, UNSPECIFIED CHRONICITY: Primary | ICD-10-CM

## 2021-04-10 PROCEDURE — 99283 EMERGENCY DEPT VISIT LOW MDM: CPT

## 2021-04-10 PROCEDURE — 6370000000 HC RX 637 (ALT 250 FOR IP): Performed by: STUDENT IN AN ORGANIZED HEALTH CARE EDUCATION/TRAINING PROGRAM

## 2021-04-10 PROCEDURE — 73562 X-RAY EXAM OF KNEE 3: CPT

## 2021-04-10 RX ORDER — NAPROXEN 375 MG/1
375 TABLET, DELAYED RELEASE ORAL 2 TIMES DAILY WITH MEALS
Qty: 60 TABLET | Refills: 0 | Status: SHIPPED | OUTPATIENT
Start: 2021-04-10 | End: 2022-01-24

## 2021-04-10 RX ORDER — METHOCARBAMOL 500 MG/1
500 TABLET, FILM COATED ORAL ONCE
Status: COMPLETED | OUTPATIENT
Start: 2021-04-10 | End: 2021-04-10

## 2021-04-10 RX ORDER — METHOCARBAMOL 500 MG/1
500 TABLET, FILM COATED ORAL 4 TIMES DAILY
Qty: 40 TABLET | Refills: 0 | Status: SHIPPED | OUTPATIENT
Start: 2021-04-10 | End: 2021-04-20

## 2021-04-10 RX ORDER — IBUPROFEN 400 MG/1
800 TABLET ORAL ONCE
Status: COMPLETED | OUTPATIENT
Start: 2021-04-10 | End: 2021-04-10

## 2021-04-10 RX ADMIN — IBUPROFEN 800 MG: 400 TABLET, FILM COATED ORAL at 20:15

## 2021-04-10 RX ADMIN — METHOCARBAMOL 500 MG: 500 TABLET ORAL at 20:15

## 2021-04-10 ASSESSMENT — PAIN DESCRIPTION - LOCATION: LOCATION: KNEE

## 2021-04-10 ASSESSMENT — PAIN SCALES - GENERAL: PAINLEVEL_OUTOF10: 10

## 2021-04-10 ASSESSMENT — PAIN DESCRIPTION - PAIN TYPE: TYPE: ACUTE PAIN

## 2021-04-10 NOTE — ED PROVIDER NOTES
of Systems    Review of systems is positive for right knee pain, right knee swelling  Review of systems is negative for rash, fever, aches, chills, abdominal pain, nausea, vomiting  Further review of systems is negative other than that mentioned in the HPI. Past Medical, Surgical, Family, and Social History     He has a past medical history of Asthma, Kidney stone, Manic depression ([address] and Meningitis. He has a past surgical history that includes fracture surgery. His family history is not on file. He reports that he has been smoking cigarettes. He has a 1.50 pack-year smoking history. He has never used smokeless tobacco. He reports that he does not drink alcohol or use drugs. Medications     Previous Medications    ALBUTEROL SULFATE HFA (PROVENTIL HFA) 108 (90 BASE) MCG/ACT INHALER    Inhale 2 puffs into the lungs every 4 hours as needed for Wheezing or Shortness of Breath (Space out to every 6 hours as symptoms improve) Space out to every 6 hours as symptoms improve. ONDANSETRON (ZOFRAN ODT) 4 MG DISINTEGRATING TABLET    Take 1-2 tablets by mouth every 8 hours as needed for Nausea or Vomiting       Allergies     He is allergic to sulfa antibiotics. Physical Exam     INITIAL VITALS: BP: (!) 161/87, Temp: 98.1 °F (36.7 °C), Pulse: 75,  , SpO2: 100 %     Gen: NAD, in bed comfortably, non-diaphoretic  Head/Eyes: Atraumatic. PERRL. EOMI bilaterally. No conjunctival injection or scleral icterus   ENT: Neck supple, trachea midline, no LAD. MMM, no posterior oropharyngeal erythema or exudate. External auditory meatus clear without discharge or erythema. No nasal congestion or discharge. Cardiovascular: RRR no murmurs, rubs, or gallops. Pulmonary:Lungs CTAB, no rales, wheezes, or ronchi   Abdominal: Soft, non-tender. No rebound or guarding. Non-peritoneal   MSK: no obvious long bone deformity. On physical examination, there is a well-healed, medial scar to the right knee.   There is no obvious erythema, induration, fluctuance or swelling. There is no warmth or overlying erythema. Patient has full range of motion of the bilateral lower extremities at the hips, knee, and ankle. No pain with passive range of motion at the right knee. DP and PT pulses intact bilaterally. There is no peripheral edema. Patient with tenderness to palpation over the right lateral and medial joint lines in addition to in the popliteal fossa. Skin:  Warm, dry. No rashes, ecchymosis or cyanosis. Neuro: Alert and oriented x 4. Cranial nerves II-XII intact. Sensation intact to light touch in bilateral upper and lower extremities         RUE: 5/5 strength at the shoulder, elbow, wrist, and  with activation of all major muscle groups        LUE: 5/5 strength at the shoulder, elbow, wrist, and  with activation of all major muscle groups        RLE: 5/5 strength at the hip, knee, and ankle with activation of all major muscle groups       LLE: 5/5 strength at the hip, knee, and ankle with activation of all major muscle groups   Extremities:  No peripheral edema. Psych: Euthymic affect. Normal rate and rhythm of speech with full prosody. Linear thought process. DiagnosticResults     EKG   None    RADIOLOGY:  XR KNEE RIGHT (3 VIEWS)   Final Result      1. No findings for acute bony abnormality. 2.  Arthritic changes in the most pronounced in the medial joint compartment. LABS:   No results found for this visit on 04/10/21. ED BEDSIDE ULTRASOUND:  None    RECENT VITALS:  BP: (!) 161/87, Temp: 98.1 °F (36.7 °C), Pulse: 75, , SpO2: 100 %     Procedures     None    ED Course     Nursing Notes, Past Medical Hx, Past Surgical Hx, Social Hx, Allergies, and Family Hx were reviewed.     The patient was given the followingmedications:  Orders Placed This Encounter   Medications    methocarbamol (ROBAXIN) tablet 500 mg    ibuprofen (ADVIL;MOTRIN) tablet 800 mg    methocarbamol (ROBAXIN) 500 MG tablet     Sig: Take 1 tablet by mouth 4 times daily for 10 days     Dispense:  40 tablet     Refill:  0    Naproxen (EC NAPROSYN) 375 MG EC tablet     Sig: Take 1 tablet by mouth 2 times daily (with meals)     Dispense:  60 tablet     Refill:  0       CONSULTS:  None    ED Course as of Apr 10 2107   Sat Apr 10, 2021   2056 IMPRESSION:     1. No findings for acute bony abnormality.     2. Arthritic changes in the most pronounced in the medial joint compartment. XR KNEE RIGHT (3 VIEWS) [JF]      ED Course User Index  [JF] Otoniel Magallanes MD       [address] / ASSESSMENT / Jaya Ibeth is a 31 y.o. Leticia Boyers a history of present illness, physical examination, past medical history as noted above who presents to the emergency department today with R knee pain. Patient history and physical examination most consistent with chronic right knee pain, likely musculoskeletal in nature versus joint pain. There is no prominent effusion, induration, warmth, erythema, or pain with passive range of motion therefore my suspicion for septic arthritis or acute infection of the joint is low. No evidence of bursitis. Patient is hemodynamically stable, without risk factors for blood clot. PERC negative. Do not believe patient requires further work-up for DVT at this time. There is no unilateral leg swelling. No evidence of abscess. Neurovascularly intact. Low suspicion for peripheral artery disease. Further, x-ray of the right knee without evidence of acute osseous abnormality however, there is evidence of right medial joint line arthritis which I suspect is contributing to the patient's symptoms. I recommended the patient follow-up with his primary care doctor as well as orthopedic surgery and physical therapy for further evaluation and management. Patient was given a prescription for naproxen and Robaxin for outpatient management of his pain.   I recommended that he follow-up with his primary care provider/outpatient provider should he want stronger pain medications, given I will not provide these from the emergency department. Patient was given robaxin and ibuprofen in the emergency department for pain management. Patient with FROM and able to ambulate without assistance. Stable vital signs. This patient was also evaluated by the attending physician. All care plans werediscussed and agreed upon. Clinical Impression     1. Right knee pain, unspecified chronicity        Disposition     PATIENT REFERRED TO:  [facility]    Schedule an appointment as soon as possible for a visit in 1 week      [facility] Emergency Department  [address] [place] [id number]  [phone]  Go to   As needed, If symptoms worsen    Luther Givens MD  [facility] [address]  [phone]    Schedule an appointment as soon as possible for a visit in 1 week        DISCHARGE MEDICATIONS:  New Prescriptions    METHOCARBAMOL (ROBAXIN) 500 MG TABLET    Take 1 tablet by mouth 4 times daily for 10 days    NAPROXEN (EC NAPROSYN) 375 MG EC TABLET    Take 1 tablet by mouth 2 times daily (with meals)       DISPOSITION    At this time, the patient was deemed appropriate for discharge. All laboratory and imaging findings were discussed with the patient, and the patient was given the opportunity to ask questions. All questions were answered to their satisfaction. At this time, the patient was ready to be dischargd. The patient was discharged with a prescription for naproxen and robaxin. I recommended that the patient follow up with their primary care physician in the next week to ensure symptom improvement and continued evaluation and management.  I further recommended that the patient follow up with PCP, orthopedic surgery, PT and I have provided them a referral.          Maddy Conn MD  04/10/21 9835

## 2021-04-10 NOTE — ED NOTES
Bed: A01-01  Expected date:   Expected time:   Means of arrival:   Comments:  harsh Nunes RN  04/10/21 1921

## 2021-06-02 ENCOUNTER — CLINICAL DOCUMENTATION (OUTPATIENT)
Dept: OTHER | Age: 36
End: 2021-06-02

## 2022-01-03 ENCOUNTER — APPOINTMENT (OUTPATIENT)
Dept: GENERAL RADIOLOGY | Age: 37
End: 2022-01-03
Payer: MEDICAID

## 2022-01-03 ENCOUNTER — HOSPITAL ENCOUNTER (EMERGENCY)
Age: 37
Discharge: HOME | End: 2022-01-03
Attending: EMERGENCY MEDICINE
Payer: MEDICAID

## 2022-01-03 VITALS
HEIGHT: 71 IN | TEMPERATURE: 99 F | WEIGHT: 260 LBS | SYSTOLIC BLOOD PRESSURE: 158 MMHG | HEART RATE: 80 BPM | OXYGEN SATURATION: 96 % | BODY MASS INDEX: 36.4 KG/M2 | DIASTOLIC BLOOD PRESSURE: 86 MMHG | RESPIRATION RATE: 18 BRPM

## 2022-01-03 DIAGNOSIS — R05.9 COUGH: ICD-10-CM

## 2022-01-03 DIAGNOSIS — U07.1 COVID-19: Primary | ICD-10-CM

## 2022-01-03 LAB
RAPID INFLUENZA  B AGN: NEGATIVE
RAPID INFLUENZA A AGN: NEGATIVE
SARS-COV-2, NAAT: DETECTED

## 2022-01-03 PROCEDURE — 87635 SARS-COV-2 COVID-19 AMP PRB: CPT

## 2022-01-03 PROCEDURE — 71045 X-RAY EXAM CHEST 1 VIEW: CPT

## 2022-01-03 PROCEDURE — 94640 AIRWAY INHALATION TREATMENT: CPT

## 2022-01-03 PROCEDURE — 87804 INFLUENZA ASSAY W/OPTIC: CPT

## 2022-01-03 PROCEDURE — 94761 N-INVAS EAR/PLS OXIMETRY MLT: CPT

## 2022-01-03 PROCEDURE — 6370000000 HC RX 637 (ALT 250 FOR IP): Performed by: PHYSICIAN ASSISTANT

## 2022-01-03 PROCEDURE — 99283 EMERGENCY DEPT VISIT LOW MDM: CPT

## 2022-01-03 RX ORDER — IBUPROFEN 400 MG/1
800 TABLET ORAL ONCE
Status: COMPLETED | OUTPATIENT
Start: 2022-01-03 | End: 2022-01-03

## 2022-01-03 RX ORDER — PROMETHAZINE HYDROCHLORIDE AND CODEINE PHOSPHATE 6.25; 1 MG/5ML; MG/5ML
5 SYRUP ORAL 4 TIMES DAILY PRN
Qty: 100 ML | Refills: 0 | Status: SHIPPED | OUTPATIENT
Start: 2022-01-03 | End: 2022-01-08

## 2022-01-03 RX ORDER — IPRATROPIUM BROMIDE AND ALBUTEROL SULFATE 2.5; .5 MG/3ML; MG/3ML
1 SOLUTION RESPIRATORY (INHALATION) ONCE
Status: COMPLETED | OUTPATIENT
Start: 2022-01-03 | End: 2022-01-03

## 2022-01-03 RX ORDER — PROMETHAZINE HYDROCHLORIDE AND CODEINE PHOSPHATE 6.25; 1 MG/5ML; MG/5ML
5 SYRUP ORAL ONCE
Status: COMPLETED | OUTPATIENT
Start: 2022-01-03 | End: 2022-01-03

## 2022-01-03 RX ADMIN — Medication 5 ML: at 13:49

## 2022-01-03 RX ADMIN — IBUPROFEN 800 MG: 400 TABLET, FILM COATED ORAL at 13:49

## 2022-01-03 RX ADMIN — IPRATROPIUM BROMIDE AND ALBUTEROL SULFATE 1 AMPULE: .5; 2.5 SOLUTION RESPIRATORY (INHALATION) at 15:13

## 2022-01-03 ASSESSMENT — ENCOUNTER SYMPTOMS
WHEEZING: 1
SORE THROAT: 0
SINUS PAIN: 0
BACK PAIN: 0
TROUBLE SWALLOWING: 0
NAUSEA: 1
ABDOMINAL PAIN: 0
SHORTNESS OF BREATH: 0
VOMITING: 0
CONSTIPATION: 0
CHEST TIGHTNESS: 1
SINUS PRESSURE: 0
RHINORRHEA: 1
COUGH: 1
DIARRHEA: 0

## 2022-01-03 ASSESSMENT — PAIN DESCRIPTION - DESCRIPTORS: DESCRIPTORS: ACHING

## 2022-01-03 ASSESSMENT — PAIN DESCRIPTION - LOCATION: LOCATION: GENERALIZED

## 2022-01-03 ASSESSMENT — PAIN SCALES - GENERAL: PAINLEVEL_OUTOF10: 9

## 2022-01-03 ASSESSMENT — PAIN DESCRIPTION - PAIN TYPE: TYPE: ACUTE PAIN

## 2022-01-03 NOTE — Clinical Note
Jasmine Brooke was seen and treated in our emergency department on 1/3/2022. He may return to work on 01/11/2022. If you have any questions or concerns, please don't hesitate to call.       Trey Govea

## 2022-01-03 NOTE — ED NOTES
Patient prepared for and ready to be discharged. Patient discharged at this time in no acute distress after verbalizing understanding of discharge instructions. Patient left after receiving After Visit Summary instructions.       Isabel Porter RN  01/03/22 1600

## 2022-01-03 NOTE — ED PROVIDER NOTES
[address] [facility]          PHYSICIAN ASSISTANT NOTE       Date of evaluation: 1/3/2022    Chief Complaint     Concern For COVID-19      History of Present Illness     Johanna Chanel is a 31 y.o. male who presents to the emergency department with flulike symptoms. The patient states his symptoms started 2 days ago. He states he has generalized fatigue, body aches, a cough and wheezing. He states he has had several coworkers test positive for COVID-19. He did not get his Covid or flu vaccines. He states he has had chills at home but is unsure whether he has had a temperature as he has not taken it. He has had some nausea but denies vomiting. Denies chest pain or shortness of breath but has had a productive cough. Denies dysuria, hematuria, diarrhea or constipation. Review of Systems     Review of Systems   Constitutional: Positive for chills and fatigue. Negative for fever. HENT: Positive for congestion and rhinorrhea. Negative for sinus pressure, sinus pain, sneezing, sore throat and trouble swallowing. Respiratory: Positive for cough, chest tightness and wheezing. Negative for shortness of breath. Cardiovascular: Negative. Negative for chest pain, palpitations and leg swelling. Gastrointestinal: Positive for nausea. Negative for abdominal pain, constipation, diarrhea and vomiting. Genitourinary: Negative. Musculoskeletal: Negative for back pain and neck pain. Skin: Negative. Neurological: Negative. Negative for dizziness, weakness, light-headedness and headaches. Psychiatric/Behavioral: Negative. All other systems reviewed and are negative. Past Medical, Surgical, Family, and Social History     He has a past medical history of Asthma, Kidney stone, Manic depression ([address] and Meningitis. He has a past surgical history that includes fracture surgery. His family history is not on file. He reports that he has been smoking cigarettes.  He has a 1.50 pack-year smoking history. He has never used smokeless tobacco. He reports that he does not drink alcohol and does not use drugs. Medications     Previous Medications    ALBUTEROL SULFATE HFA (PROVENTIL HFA) 108 (90 BASE) MCG/ACT INHALER    Inhale 2 puffs into the lungs every 4 hours as needed for Wheezing or Shortness of Breath (Space out to every 6 hours as symptoms improve) Space out to every 6 hours as symptoms improve. NAPROXEN (EC NAPROSYN) 375 MG EC TABLET    Take 1 tablet by mouth 2 times daily (with meals)    ONDANSETRON (ZOFRAN ODT) 4 MG DISINTEGRATING TABLET    Take 1-2 tablets by mouth every 8 hours as needed for Nausea or Vomiting       Allergies     He is allergic to sulfa antibiotics. Physical Exam     INITIAL VITALS: BP: (!) 164/99, Temp: 101.3 °F (38.5 °C), Pulse: 96, Resp: 19, SpO2: 98 %  Physical Exam  Vitals and nursing note reviewed. Constitutional:       General: He is not in acute distress. Appearance: He is well-developed. HENT:      Head: Normocephalic and atraumatic. Right Ear: Tympanic membrane and external ear normal.      Left Ear: Tympanic membrane and external ear normal.      Nose: Nose normal.      Mouth/Throat:      Mouth: Mucous membranes are moist.   Eyes:      General:         Right eye: No discharge. Left eye: No discharge. Extraocular Movements: Extraocular movements intact. Conjunctiva/sclera: Conjunctivae normal.      Pupils: Pupils are equal, round, and reactive to light. Cardiovascular:      Rate and Rhythm: Regular rhythm. Pulses: Normal pulses. Heart sounds: Normal heart sounds. No murmur heard. Comments: Mildly tachycardic rate  Pulmonary:      Effort: Pulmonary effort is normal.      Breath sounds: Wheezing present. No rhonchi. Comments: On examination he has noted on the right with good air movement throughout  Abdominal:      General: Bowel sounds are normal.      Palpations: Abdomen is soft. drowsiness. May impair ability to operate a motor vehicle or machinery. Do not use in combination with alcohol. Dispense:  100 mL     Refill:  0       CONSULTS:  None    MEDICAL Madison Santana / EL / Austen Jorge is a 31 y.o. male who presented to the emergency department with generalized body aches, chills and a productive cough with wheezing. His chest x-ray shows no infiltrate, effusion or pneumothorax. Initial temp was 101 and he was given ibuprofen as he took Tylenol couple hours ago at home. Prior to discharge his temp is 99. He was initially tachycardic as well but repeat pulse is 80. He was given Phenergan with codeine for the cough and has had some relief. His rapid influenza was negative however his COVID-19 is positive here. He will be discharged home with Covid and quarantine instructions. He is drink fluids to stay well-hydrated. He is to follow-up with his primary care physician for reevaluation however is to return for worsening symptoms or concerns as discussed. This patient was also evaluated by the attending physician. All care plans were discussed and agreed upon. Clinical Impression     1. COVID-19    2. Cough        Disposition     PATIENT REFERRED TO:  [facility]            DISCHARGE MEDICATIONS:  New Prescriptions    PROMETHAZINE-CODEINE (PHENERGAN WITH CODEINE) 6.25-10 MG/5ML SYRUP    Take 5 mLs by mouth 4 times daily as needed for Cough for up to 5 days. WARNING: may cause drowsiness. May impair ability to operate a motor vehicle or machinery. Do not use in combination with alcohol.        [address], [address]  01/03/22 9483

## 2022-01-03 NOTE — ED PROVIDER NOTES
ED Attending Attestation Note     Date of evaluation: 1/3/2022    This patient was seen by the VIK. I have seen and examined the patient, agree with the workup, evaluation, management and diagnosis. The care plan has been discussed. I was present for any procedures performed in the VIK's note and have made edits to the note where appropriate. My assessment reveals 31 y.o. male presenting for viral respiratory symptoms and concern for Covid. His rapid Covid testing here is positive. He is nontoxic-appearing, borderline febrile, in no respiratory distress, saturations and respiratory rate reassuring.          Maine Allen MD  01/03/22 6138

## 2022-01-04 ENCOUNTER — CARE COORDINATION (OUTPATIENT)
Dept: CARE COORDINATION | Age: 37
End: 2022-01-04

## 2022-01-04 NOTE — CARE COORDINATION
Outreach attempt regarding pt recent visit to the ED. Pt was unable to reach today; all contact numbers listed are invalid. ACM unable to find a valid contact number for patient. No further outreach at this time.

## 2022-01-24 ENCOUNTER — HOSPITAL ENCOUNTER (EMERGENCY)
Age: 37
Discharge: HOME | End: 2022-01-24
Attending: EMERGENCY MEDICINE
Payer: MEDICAID

## 2022-01-24 VITALS
OXYGEN SATURATION: 97 % | DIASTOLIC BLOOD PRESSURE: 94 MMHG | RESPIRATION RATE: 18 BRPM | HEART RATE: 90 BPM | SYSTOLIC BLOOD PRESSURE: 163 MMHG | TEMPERATURE: 99 F

## 2022-01-24 DIAGNOSIS — K03.81 CRACKED TOOTH: ICD-10-CM

## 2022-01-24 DIAGNOSIS — U07.1 COVID-19: Primary | ICD-10-CM

## 2022-01-24 PROCEDURE — 6370000000 HC RX 637 (ALT 250 FOR IP): Performed by: EMERGENCY MEDICINE

## 2022-01-24 PROCEDURE — 99282 EMERGENCY DEPT VISIT SF MDM: CPT

## 2022-01-24 RX ORDER — PREDNISONE 20 MG/1
60 TABLET ORAL DAILY
Qty: 30 TABLET | Refills: 0 | Status: SHIPPED | OUTPATIENT
Start: 2022-01-24 | End: 2022-02-03

## 2022-01-24 RX ORDER — PREDNISONE 20 MG/1
60 TABLET ORAL ONCE
Status: COMPLETED | OUTPATIENT
Start: 2022-01-24 | End: 2022-01-24

## 2022-01-24 RX ORDER — ALBUTEROL SULFATE 90 UG/1
2 AEROSOL, METERED RESPIRATORY (INHALATION) 4 TIMES DAILY PRN
Qty: 18 G | Refills: 0 | Status: SHIPPED | OUTPATIENT
Start: 2022-01-24 | End: 2022-02-14 | Stop reason: SDUPTHER

## 2022-01-24 RX ORDER — IPRATROPIUM BROMIDE AND ALBUTEROL SULFATE 2.5; .5 MG/3ML; MG/3ML
1 SOLUTION RESPIRATORY (INHALATION) ONCE
Status: COMPLETED | OUTPATIENT
Start: 2022-01-24 | End: 2022-01-24

## 2022-01-24 RX ADMIN — PREDNISONE 60 MG: 20 TABLET ORAL at 01:48

## 2022-01-24 RX ADMIN — IPRATROPIUM BROMIDE AND ALBUTEROL SULFATE 1 AMPULE: .5; 3 SOLUTION RESPIRATORY (INHALATION) at 01:48

## 2022-01-24 NOTE — ED PROVIDER NOTES
[address] [facility] ENCOUNTER        Pt Name: Jonathan Rivas  MRN: [id number]  [place] 1985  Date of evaluation: 1/24/2022  Provider: Mando Hollis MD  PCP: No primary care provider on file. This patient was seen and evaluated by the attending physician Mando Hollis MD.      [address]       Chief Complaint   Patient presents with    Dental Problem     two \"chip\" teeth on upper right and upper left; noticed it two days ago    Positive For Covid-19     since the 1/5; persistant cough and auditiory wheezing        HISTORY OF PRESENT ILLNESS   (Location/Symptom, Timing/Onset, Context/Setting, Quality, Duration, Modifying Factors, Severity)  Note limiting factors. Jonathan Rivas is a 30 y.o. male here today with a CC Of persistent cough x2wks after being dx'd with COVID. No other c/o. No n/vd  No fever, chills, sweats  Noted some sore throat, and some dental caries as well. Nursing Notes were all reviewed and agreed with or any disagreements were addressed  in the HPI. REVIEW OF SYSTEMS    (2-9 systems for level 4, 10 or more for level 5)     Review of Systems    Positives and Pertinent negatives as per HPI. Except as noted abovein the ROS, all other systems were reviewed and negative. PAST MEDICAL HISTORY     Past Medical History:   Diagnosis Date    Asthma     Kidney stone     Manic depression ([facility] [address]     Meningitis     April 2011 - treated at [address] [place] [address]     Past Surgical History:   Procedure Laterality Date    FRACTURE SURGERY           CURRENTMEDICATIONS       Previous Medications    No medications on file         ALLERGIES     Sulfa antibiotics    FAMILYHISTORY     History reviewed. No pertinent family history.        SOCIAL HISTORY       Social History     Socioeconomic History    Marital status: Single     Spouse name: None    Number of children: None    Years of education: None    Highest education level: None   Occupational History    None   Tobacco Use    Smoking status: Current Every Day Smoker     Packs/day: 0.50     Years: 3.00     Pack years: 1.50     Types: Cigarettes    Smokeless tobacco: Never Used   Vaping Use    Vaping Use: Never used   Substance and Sexual Activity    Alcohol use: No    Drug use: No    Sexual activity: Yes     Partners: Female   Other Topics Concern    None   Social History Narrative    None     Social Determinants of Health     Financial Resource Strain:     Difficulty of Paying Living Expenses: Not on file   Food Insecurity:     Worried About Running Out of Food in the Last Year: Not on file    Satya of Food in the Last Year: Not on file   Transportation Needs:     Lack of Transportation (Medical): Not on file    Lack of Transportation (Non-Medical):  Not on file   Physical Activity:     Days of Exercise per Week: Not on file    Minutes of Exercise per Session: Not on file   Stress:     Feeling of Stress : Not on file   Social Connections:     Frequency of Communication with Friends and Family: Not on file    Frequency of Social Gatherings with Friends and Family: Not on file    Attends Adventism Services: Not on file    Active Member of [address] [place] [address] [email or web address] or Organizations: Not on file    Attends Club or Organization Meetings: Not on file    Marital Status: Not on file   Intimate Partner Violence:     Fear of Current or Ex-Partner: Not on file    Emotionally Abused: Not on file    Physically Abused: Not on file    Sexually Abused: Not on file   Housing Stability:     Unable to Pay for Housing in the Last Year: Not on file    Number of [place] in the Last Year: Not on file    Unstable Housing in the Last Year: Not on file       SCREENINGS             PHYSICAL EXAM    (up to 7 for level 4, 8 or more for level 5)     ED Triage Vitals   BP Temp Temp src Pulse Resp SpO2 Height Weight   -- -- -- -- -- -- -- --       Physical Exam     General Appearance: Alert, cooperative, no distress, appears stated age. Head:  Normocephalic, without obvious abnormality, atraumatic. Eyes:  conjunctiva/corneas clear, EOM's intact. Sclera anicteric. ENT: Mucous membranes moist.   Neck: Supple, symmetrical, trachea midline, no adenopathy. No jugular venous distention. Lungs:   No Respiratory Distress. Scattered wheezing. Chest Wall:  Atrauamtic   Heart:  RRR   Abdomen:   Soft, NT, ND   Extremities:  Full range of motion. Pulses: Symmetric x4   Skin:  No rashes or lesions to exposed skin. Neurologic: Alert and oriented X 3. Motor grossly normal.  Speech clear. DIAGNOSTIC RESULTS   LABS:    Labs Reviewed - No data to display    All other labs were within normal range or not returned as of this dictation. EKG: All EKG's are interpreted by the Emergency Department Physician in the absence of a cardiologist.  Please see their note for interpretation of EKG. RADIOLOGY:   Non-plain film images such as CT, Ultrasound and MRI are read by the radiologist. Plain radiographic images are visualized andpreliminarily interpreted by the  ED Provider with the below findings:        Interpretation perthe Radiologist below, if available at the time of this note:    No orders to display     No results found. PROCEDURES   Unless otherwise noted below, none     Procedures    CRITICAL CARE TIME   N/A    CONSULTS:  None      EMERGENCY DEPARTMENT COURSE and DIFFERENTIAL DIAGNOSIS/MDM:   Vitals:    Vitals:    01/24/22 0137   BP: (!) 163/94   Pulse: 90   Resp: 18   Temp: 99 °F (37.2 °C)   TempSrc: Oral   SpO2: 97%       Patient was given thefollowing medications:  Medications   ipratropium-albuterol (DUONEB) nebulizer solution 1 ampule (has no administration in time range)   predniSONE (DELTASONE) tablet 60 mg (has no administration in time range)       37M with COVID. HE's wheezing, but otherwise benign exam.  Steroids, Bronchodilators, F/u PCP.       FINAL IMPRESSION      1. COVID-19    2. Cracked tooth          DISPOSITION/PLAN   DISPOSITION Decision To Discharge 01/24/2022 01:41:28 AM      PATIENT REFERREDTO:  No follow-up provider specified. DISCHARGE MEDICATIONS:  New Prescriptions    ALBUTEROL SULFATE HFA (VENTOLIN HFA) 108 (90 BASE) MCG/ACT INHALER    Inhale 2 puffs into the lungs 4 times daily as needed for Wheezing    PREDNISONE (DELTASONE) 20 MG TABLET    Take 3 tablets by mouth daily for 10 days       DISCONTINUED MEDICATIONS:  Discontinued Medications    ALBUTEROL SULFATE HFA (PROVENTIL HFA) 108 (90 BASE) MCG/ACT INHALER    Inhale 2 puffs into the lungs every 4 hours as needed for Wheezing or Shortness of Breath (Space out to every 6 hours as symptoms improve) Space out to every 6 hours as symptoms improve.     NAPROXEN (EC NAPROSYN) 375 MG EC TABLET    Take 1 tablet by mouth 2 times daily (with meals)    ONDANSETRON (ZOFRAN ODT) 4 MG DISINTEGRATING TABLET    Take 1-2 tablets by mouth every 8 hours as needed for Nausea or Vomiting              (Please note that portions ofthis note were completed with a voice recognition program.  Efforts were made to edit the dictations but occasionally words are mis-transcribed.)    Israel Vides MD (electronically signed)           Israel Vides MD  01/24/22 1764

## 2022-02-14 ENCOUNTER — HOSPITAL ENCOUNTER (EMERGENCY)
Age: 37
Discharge: HOME | End: 2022-02-14
Payer: MEDICAID

## 2022-02-14 ENCOUNTER — APPOINTMENT (OUTPATIENT)
Dept: GENERAL RADIOLOGY | Age: 37
End: 2022-02-14
Payer: MEDICAID

## 2022-02-14 VITALS
WEIGHT: 283.07 LBS | TEMPERATURE: 98.6 F | SYSTOLIC BLOOD PRESSURE: 156 MMHG | HEIGHT: 70 IN | BODY MASS INDEX: 40.53 KG/M2 | DIASTOLIC BLOOD PRESSURE: 100 MMHG | OXYGEN SATURATION: 97 % | RESPIRATION RATE: 18 BRPM | HEART RATE: 91 BPM

## 2022-02-14 DIAGNOSIS — R11.0 NAUSEA: ICD-10-CM

## 2022-02-14 DIAGNOSIS — R03.0 ELEVATED BLOOD PRESSURE READING: ICD-10-CM

## 2022-02-14 DIAGNOSIS — J45.20 MILD INTERMITTENT ASTHMATIC BRONCHITIS WITHOUT COMPLICATION: ICD-10-CM

## 2022-02-14 DIAGNOSIS — L60.0 INGROWN LEFT BIG TOENAIL: Primary | ICD-10-CM

## 2022-02-14 LAB
ANION GAP SERPL CALCULATED.3IONS-SCNC: 10 MMOL/L (ref 3–16)
BASOPHILS ABSOLUTE: 0 K/UL (ref 0–0.2)
BASOPHILS RELATIVE PERCENT: 0.3 %
BUN BLDV-MCNC: 10 MG/DL (ref 7–20)
CALCIUM SERPL-MCNC: 8.8 MG/DL (ref 8.3–10.6)
CHLORIDE BLD-SCNC: 107 MMOL/L (ref 99–110)
CO2: 23 MMOL/L (ref 21–32)
CREAT SERPL-MCNC: 0.9 MG/DL (ref 0.9–1.3)
EOSINOPHILS ABSOLUTE: 0.2 K/UL (ref 0–0.6)
EOSINOPHILS RELATIVE PERCENT: 1.9 %
GFR AFRICAN AMERICAN: >60
GFR NON-AFRICAN AMERICAN: >60
GLUCOSE BLD-MCNC: 121 MG/DL (ref 70–99)
HCT VFR BLD CALC: 46.1 % (ref 40.5–52.5)
HEMOGLOBIN: 15.6 G/DL (ref 13.5–17.5)
LYMPHOCYTES ABSOLUTE: 1.5 K/UL (ref 1–5.1)
LYMPHOCYTES RELATIVE PERCENT: 16.3 %
MCH RBC QN AUTO: 33 PG (ref 26–34)
MCHC RBC AUTO-ENTMCNC: 33.9 G/DL (ref 31–36)
MCV RBC AUTO: 97.3 FL (ref 80–100)
MONOCYTES ABSOLUTE: 1.1 K/UL (ref 0–1.3)
MONOCYTES RELATIVE PERCENT: 12.1 %
NEUTROPHILS ABSOLUTE: 6.6 K/UL (ref 1.7–7.7)
NEUTROPHILS RELATIVE PERCENT: 69.4 %
PDW BLD-RTO: 13.9 % (ref 12.4–15.4)
PLATELET # BLD: 220 K/UL (ref 135–450)
PMV BLD AUTO: 7.7 FL (ref 5–10.5)
POTASSIUM REFLEX MAGNESIUM: 3.9 MMOL/L (ref 3.5–5.1)
RBC # BLD: 4.74 M/UL (ref 4.2–5.9)
SODIUM BLD-SCNC: 140 MMOL/L (ref 136–145)
WBC # BLD: 9.4 K/UL (ref 4–11)

## 2022-02-14 PROCEDURE — 71046 X-RAY EXAM CHEST 2 VIEWS: CPT

## 2022-02-14 PROCEDURE — 80048 BASIC METABOLIC PNL TOTAL CA: CPT

## 2022-02-14 PROCEDURE — 11730 AVULSION NAIL PLATE SIMPLE 1: CPT

## 2022-02-14 PROCEDURE — 99283 EMERGENCY DEPT VISIT LOW MDM: CPT

## 2022-02-14 PROCEDURE — 85025 COMPLETE CBC W/AUTO DIFF WBC: CPT

## 2022-02-14 PROCEDURE — 6370000000 HC RX 637 (ALT 250 FOR IP): Performed by: NURSE PRACTITIONER

## 2022-02-14 PROCEDURE — 36415 COLL VENOUS BLD VENIPUNCTURE: CPT

## 2022-02-14 RX ORDER — CEPHALEXIN 250 MG/1
500 CAPSULE ORAL ONCE
Status: COMPLETED | OUTPATIENT
Start: 2022-02-14 | End: 2022-02-14

## 2022-02-14 RX ORDER — OXYCODONE HYDROCHLORIDE AND ACETAMINOPHEN 5; 325 MG/1; MG/1
1 TABLET ORAL EVERY 6 HOURS PRN
Qty: 4 TABLET | Refills: 0 | Status: SHIPPED | OUTPATIENT
Start: 2022-02-14 | End: 2022-02-15

## 2022-02-14 RX ORDER — OMEPRAZOLE 20 MG/1
20 CAPSULE, DELAYED RELEASE ORAL
Qty: 60 CAPSULE | Refills: 2 | Status: SHIPPED | OUTPATIENT
Start: 2022-02-14 | End: 2022-03-05

## 2022-02-14 RX ORDER — CEPHALEXIN 500 MG/1
500 CAPSULE ORAL 3 TIMES DAILY
Qty: 15 CAPSULE | Refills: 0 | Status: SHIPPED | OUTPATIENT
Start: 2022-02-14 | End: 2022-02-19

## 2022-02-14 RX ORDER — ONDANSETRON 4 MG/1
4-8 TABLET, ORALLY DISINTEGRATING ORAL EVERY 8 HOURS PRN
Qty: 12 TABLET | Refills: 0 | Status: SHIPPED | OUTPATIENT
Start: 2022-02-14 | End: 2022-03-05

## 2022-02-14 RX ORDER — ACETAMINOPHEN 325 MG/1
325 TABLET ORAL ONCE
Status: COMPLETED | OUTPATIENT
Start: 2022-02-14 | End: 2022-02-14

## 2022-02-14 RX ORDER — ALBUTEROL SULFATE 90 UG/1
2 AEROSOL, METERED RESPIRATORY (INHALATION) 4 TIMES DAILY PRN
Qty: 18 G | Refills: 1 | Status: SHIPPED | OUTPATIENT
Start: 2022-02-14

## 2022-02-14 RX ORDER — PREDNISONE 10 MG/1
10 TABLET ORAL DAILY
Qty: 5 TABLET | Refills: 0 | Status: SHIPPED | OUTPATIENT
Start: 2022-02-15 | End: 2022-02-20

## 2022-02-14 RX ORDER — LIDOCAINE HYDROCHLORIDE 20 MG/ML
5 INJECTION, SOLUTION INFILTRATION; PERINEURAL ONCE
Status: DISCONTINUED | OUTPATIENT
Start: 2022-02-14 | End: 2022-02-15 | Stop reason: HOSPADM

## 2022-02-14 RX ORDER — OXYCODONE HYDROCHLORIDE AND ACETAMINOPHEN 5; 325 MG/1; MG/1
1 TABLET ORAL ONCE
Status: COMPLETED | OUTPATIENT
Start: 2022-02-14 | End: 2022-02-14

## 2022-02-14 RX ORDER — PREDNISONE 20 MG/1
20 TABLET ORAL ONCE
Status: COMPLETED | OUTPATIENT
Start: 2022-02-14 | End: 2022-02-14

## 2022-02-14 RX ORDER — BUPIVACAINE HYDROCHLORIDE 5 MG/ML
30 INJECTION, SOLUTION EPIDURAL; INTRACAUDAL ONCE
Status: DISCONTINUED | OUTPATIENT
Start: 2022-02-14 | End: 2022-02-15 | Stop reason: HOSPADM

## 2022-02-14 RX ADMIN — CEPHALEXIN 500 MG: 250 CAPSULE ORAL at 19:05

## 2022-02-14 RX ADMIN — PREDNISONE 20 MG: 20 TABLET ORAL at 19:05

## 2022-02-14 RX ADMIN — OXYCODONE HYDROCHLORIDE AND ACETAMINOPHEN 1 TABLET: 5; 325 TABLET ORAL at 19:05

## 2022-02-14 RX ADMIN — ACETAMINOPHEN 325 MG: 325 TABLET ORAL at 19:05

## 2022-02-14 ASSESSMENT — PAIN DESCRIPTION - LOCATION: LOCATION: TOE (COMMENT WHICH ONE)

## 2022-02-14 ASSESSMENT — PAIN SCALES - GENERAL
PAINLEVEL_OUTOF10: 9
PAINLEVEL_OUTOF10: 10

## 2022-02-14 ASSESSMENT — PAIN DESCRIPTION - PAIN TYPE: TYPE: ACUTE PAIN

## 2022-02-14 ASSESSMENT — ENCOUNTER SYMPTOMS
COUGH: 1
ABDOMINAL PAIN: 1
NAUSEA: 1
WHEEZING: 1
SHORTNESS OF BREATH: 1

## 2022-02-14 ASSESSMENT — PAIN DESCRIPTION - FREQUENCY: FREQUENCY: CONTINUOUS

## 2022-02-14 ASSESSMENT — PAIN DESCRIPTION - ORIENTATION: ORIENTATION: LEFT;RIGHT

## 2022-02-14 ASSESSMENT — PAIN DESCRIPTION - DESCRIPTORS: DESCRIPTORS: THROBBING

## 2022-02-14 NOTE — ED PROVIDER NOTES
[address] [facility] [address]  Dept: [phone]  Loc: [phone]  eMERGENCYdEPARTMENT eNCOUnter      Pt Name: José Mata  MRN: [id number]  [place] 1985  Date of evaluation: 2/14/2022  Provider:Charlette Anderson, APRN - CNP     Independently evaluated by the advanced practice provider    [address]       Chief Complaint   Patient presents with    Toe Pain     Pt arrives for eval of ingrown toe nails on both great toes pt also states. pt sts cough present still after covid in January       CRITICAL CARE TIME         HISTORY OF PRESENT ILLNESS  (Location/Symptom, Timing/Onset, Context/Setting, Quality, Duration,Modifying Factors, Severity.)   José Mata is a 30 y.o. male who presents to the emergency department past medical history: Asthma, cigarette smoking, kidney stone, depression. Patient also has a recent history of Covid in January 2021. He has several concerns and complaints thus far today: Primary complaint of coming in was left toenail ingrown and causing pain. Following complaints of to deal with COVID sequela which is an ongoing intermittent productive cough similar to that when he has bronchitis following an asthma exacerbation, experiences fatigue, also has intermittent bouts of nausea with eating and diarrhea. Negative for fever. Negative for chills. No weight loss. No chest pain. No headache. Does experience nasal congestion more frequently since recovering from Covid. Stop smoking cigarettes 2 days ago. Has no primary care physician. Currently denies abdominal pain. Currently denies nausea. Nursing Notes were reviewedand agreed with or any disagreements were addressed in the HPI. REVIEW OF SYSTEMS    (2-9 systems for level 4, 10 or more for level 5)     Review of Systems   Constitutional: Negative for activity change and appetite change. HENT: Positive for congestion. Respiratory: Positive for cough, shortness of breath and wheezing. Gastrointestinal: Positive for abdominal pain and nausea. Intermittent epigastric abdominal pain postprandial, intermittent nausea postprandial, status post COVID January 2022   Genitourinary: Negative. Skin:        Left great toenail ingrown, toe pain   Neurological: Negative. Except as noted above the remainder of the review of systems was reviewed and negative. PAST MEDICAL HISTORY         Diagnosis Date    Asthma     Kidney stone     Manic depression ([facility] [address]     Meningitis     April 2011 - treated at [facility]           Procedure Laterality Date    FRACTURE SURGERY         CURRENT MEDICATIONS     [unfilled]    ALLERGIES     Sulfa antibiotics    FAMILY HISTORY     History reviewed. No pertinent family history. No family status information on file. SOCIAL HISTORY      reports that he has been smoking cigarettes. He has a 1.50 pack-year smoking history. He has never used smokeless tobacco. He reports that he does not drink alcohol and does not use drugs. PHYSICAL EXAM    (up to 7 for level 4, 8 or more for level 5)     ED Triage Vitals   Enc Vitals Group      BP       Pulse       Resp       Temp       Temp src       SpO2       Weight       Height       Head Circumference       Peak Flow       Pain Score       Pain Loc       Pain Edu? Excl. in [address]? Physical Exam  Vitals and nursing note reviewed. Constitutional:       General: He is not in acute distress. Appearance: He is not ill-appearing, toxic-appearing or diaphoretic. Cardiovascular:      Rate and Rhythm: Normal rate and regular rhythm. Pulses: Normal pulses. Pulmonary:      Effort: Pulmonary effort is normal.      Breath sounds: Wheezing present. Comments: Scattered wheezing  Abdominal:      General: Bowel sounds are normal.      Palpations: Abdomen is soft. Tenderness:  There is no abdominal tenderness. Skin:     General: Skin is warm and dry. Capillary Refill: Capillary refill takes less than 2 seconds. Comments: Bilateral toenails are thick, irregular shaped. Concerning for onychomycosis. Left great toenail curved and rolled under the fibular side of the nail cuticle. Tenderness all along the nail cuticle. No evidence of pus. No discoloration of the surrounding skin or tissue. No bogginess. Neurological:      General: No focal deficit present. Mental Status: He is alert and oriented to person, place, and time. Psychiatric:         Mood and Affect: Mood normal.         Behavior: Behavior normal.           DIAGNOSTIC RESULTS     EKG: All EKG's are interpreted by the Emergency Department Physician who either signs or Co-signs this chart in the absence of a cardiologist.    RADIOLOGY:   Non-plain film images such as CT, Ultrasound and MRI are read by the radiologist. Plain radiographic images are visualized and preliminarilyinterpreted by the emergency physician with the below findings:    Interpretation per the Radiologist below,if available at the time of this note:    XR CHEST (2 VW)   Final Result   No acute abnormality. LABS:  Labs Reviewed   BASIC METABOLIC PANEL W/ REFLEX TO MG FOR LOW K - Abnormal; Notable for the following components:       Result Value    Glucose 121 (*)     All other components within normal limits    Narrative:     Performed at:  [facility]  [address] [facility] [place], [facility]   Phone [phone]   CBC WITH AUTO DIFFERENTIAL    Narrative:     Performed at:  [facility]  [address] [facility] [place], [facility]   Phone [phone]       All other labs were within normal range or not returned as of this dictation.     EMERGENCY DEPARTMENT COURSE and DIFFERENTIAL DIAGNOSIS/MDM:   Vitals:    Vitals:    02/14/22 1827   BP: (!) 156/100   Pulse: 91 Resp: 18   Temp: 98.6 °F (37 °C)   TempSrc: Oral   SpO2: 97%   Weight: 283 lb 1.1 oz (128.4 kg)   Height: 5' 10\" (1.778 m)     Medications   bupivacaine (PF) (MARCAINE) 0.5 % injection 150 mg (has no administration in time range)   lidocaine 2 % injection 5 mL (has no administration in time range)   predniSONE (DELTASONE) tablet 20 mg (20 mg Oral Given 2/14/22 1905)   cephALEXin (KEFLEX) capsule 500 mg (500 mg Oral Given 2/14/22 1905)   oxyCODONE-acetaminophen (PERCOCET) 5-325 MG per tablet 1 tablet (1 tablet Oral Given 2/14/22 1905)   acetaminophen (TYLENOL) tablet 325 mg (325 mg Oral Given 2/14/22 1905)       MDM  Patient was seen and evaluated per myself. Dr. Ericka Restrepo present available for consultation as needed. Clinical exam: This gentleman is afebrile with elevated blood pressure 156/100. No tachycardia. No hypoxia. Lung fields fairly clear with scattered wheezing. Head ears eyes nose and throat exam otherwise unremarkable. Abdominal exam nontender nondistended. Bowel sounds no pain. Left foot toes likely have onychomycosis. However his left great toe nail seems to be ingrown fibular side of the nail cuticle. It is tender without evidence of abscess. Plan of care discussed with the patient: We will obtain basic laboratory studies including chest x-ray. Initiate oral antibiotic therapy for ingrown toenail infection without abscess, likely developing paronychia secondary to the ingrown toenail. Patient is also agreeable to a left great toe nerve block and will attempt a nail wedge removal.    Upon discharge: Patient will receive a PCP referral, podiatry referral, oral antibiotic Keflex. He needs a podiatry referral for further toenail management due to what I believe is onychomycosis. He will receive a prescription for Mucinex, oral steroids prednisone, and inhaler as well.   And he will also be provided a prescription for omeprazole with a GI referral as I believe he may have some post COVID gastritis possibly being caused by an H. pylori secondary to the viral illness. Again all of this is discussed with the patient he is in agreement      1940: Left great toe nerve block: 2% plain lidocaine and plain 0.5% Marcaine: 1:1 solution total 10 mL, followed by warm water with Hibiclens foot soak    2005: Pain and nerve block test to the left toe: Patient reports no pain with exam.  Proceeding to perform nail wedge extraction. Reference procedure note    Chest x-ray reviewed negative for atelectasis pneumonia or effusion. CBC and metabolic panel is unremarkable. Patient's blood pressure is elevated as I noted. He very well could have underlying hypertension. No indication that would warrant emergent or urgent treatment. I believe that he can be safely discharged home with a PCP referral, podiatry referral.  He will be given PPI for the post COVID intermittent gastritis symptoms. I do not believe that CT of the abdomen and pelvis is warranted. He will be given prescription for low-dose oral prednisone, inhaler, prescription for Mucinex. PCP referral is also needed for establishment of care for his asthmatic bronchitis which I believe is secondary to post COVID, underlying smoking and a prior known history of asthma. Podiatry referral provided for the ingrown toenail follow-up as well as I am very suspicious for onychomycosis. Below lip stick prescriptions provided. Discussion of the hypertension, follow-ups and establishment of primary care have also been provided. Patient verbalized understanding was appreciative of the care and was discharged home in good condition.         CONSULTS:  None    PROCEDURES:  Nail Removal    Date/Time: 2/14/2022 7:40 PM  Performed by: STANISLAW Torres CNP  Authorized by: STANISLAW Torres CNP     Consent:     Consent obtained:  Verbal    Consent given by:  Patient    Risks discussed:  Bleeding, incomplete removal, infection, pain and permanent nail deformity    Alternatives discussed:  No treatment and referral  Location:     Foot:  L big toe  Pre-procedure details:     Skin preparation:  Hibiclens  Anesthesia (see MAR for exact dosages): Anesthesia method:  Nerve block    Block location:  Left great toe    Needle gauge: 22 G. Block anesthetic:  Bupivacaine 0.5% w/o epi and lidocaine 2% w/o epi    Block technique:  Web space MTP joint medial and lateral    Block injection procedure:  Anatomic landmarks identified, anatomic landmarks palpated, introduced needle, negative aspiration for blood and incremental injection    Block outcome:  Anesthesia achieved  Nail Removal:     Nail removed:  Partial    Nail side:  Medial    Nail bed repaired: no      Removed nail replaced and anchored: no    Ingrown nail:     Wedge excision of skin: yes      Nail matrix removed or ablated:  None  Post-procedure details:     Dressing:  Post-op shoe    Patient tolerance of procedure: Tolerated well, no immediate complications        FINAL IMPRESSION      1. Ingrown left big toenail    2. Elevated blood pressure reading    3. Mild intermittent asthmatic bronchitis without complication    4.  Nausea          DISPOSITION/PLAN   [unfilled]    PATIENT REFERRED TO:  [facility]  [address] [facility] [address] [facility]  [phone]    As needed, If symptoms worsen    Dylan Salinas [address]  [phone]      podiatry referral: ingrown toenail and possible nail fungus    [facility]) Pre-Services  [address] [facility], [address]  [address] [facility] [place]  [phone]            DISCHARGE MEDICATIONS:  New Prescriptions    CEPHALEXIN (KEFLEX) 500 MG CAPSULE    Take 1 capsule by mouth 3 times daily for 5 days    OMEPRAZOLE (PRILOSEC) 20 MG DELAYED RELEASE CAPSULE    Take 1 capsule by mouth 2 times daily (before meals)    ONDANSETRON (ZOFRAN ODT) 4 MG DISINTEGRATING TABLET    Take 1-2 tablets by mouth every 8 hours as needed for Nausea    OXYCODONE-ACETAMINOPHEN (PERCOCET) 5-325 MG PER TABLET    Take 1 tablet by mouth every 6 hours as needed for Pain for up to 4 doses. Intended supply: 3 days. Take lowest dose possible to manage pain    PREDNISONE (DELTASONE) 10 MG TABLET    Take 1 tablet by mouth daily for 5 days       (Please note that portions of this note were completed with a voice recognition program.  Efforts were made to edit the dictations but occasionally words are mis-transcribed.)    Astrid Santos, STANISLAW - CNP     This dictation was performed with a verbal recognition program (DRAGON) and it was checked for errors. It is possible that there are still dictated errors within this office note. If so, please bring any errors to my attention for an addendum. All efforts were made to ensure that this office note is accurate. This dictation was performed with a verbal recognition program (DRAGON) and it was checked for errors. It is possible that there are still dictated errors within this office note. If so, please bring any errors to my attention for an addendum. All efforts were made to ensure that this office note is accurate.          STANISLAW Cope - CNP  02/14/22 6643

## 2022-02-15 NOTE — ED NOTES
PT in room awaiting for NP to come in and help him with his toe nails.       Eloy Allen RN  02/14/22 9317

## 2022-02-23 ENCOUNTER — HOSPITAL ENCOUNTER (EMERGENCY)
Age: 37
Discharge: HOME | End: 2022-02-24
Attending: EMERGENCY MEDICINE
Payer: MEDICAID

## 2022-02-23 VITALS
WEIGHT: 289.7 LBS | OXYGEN SATURATION: 98 % | BODY MASS INDEX: 40.56 KG/M2 | RESPIRATION RATE: 17 BRPM | HEIGHT: 71 IN | SYSTOLIC BLOOD PRESSURE: 144 MMHG | TEMPERATURE: 98.4 F | HEART RATE: 80 BPM | DIASTOLIC BLOOD PRESSURE: 96 MMHG

## 2022-02-23 DIAGNOSIS — Z98.890 H/O TOE SURGERY: ICD-10-CM

## 2022-02-23 DIAGNOSIS — M79.675 PAIN OF TOE OF LEFT FOOT: Primary | ICD-10-CM

## 2022-02-23 DIAGNOSIS — R03.0 ELEVATED BLOOD PRESSURE READING: ICD-10-CM

## 2022-02-23 PROCEDURE — 6370000000 HC RX 637 (ALT 250 FOR IP): Performed by: EMERGENCY MEDICINE

## 2022-02-23 PROCEDURE — 64450 NJX AA&/STRD OTHER PN/BRANCH: CPT

## 2022-02-23 PROCEDURE — 99284 EMERGENCY DEPT VISIT MOD MDM: CPT

## 2022-02-23 RX ORDER — BUPIVACAINE HYDROCHLORIDE 5 MG/ML
30 INJECTION, SOLUTION EPIDURAL; INTRACAUDAL ONCE
Status: DISCONTINUED | OUTPATIENT
Start: 2022-02-24 | End: 2022-02-24 | Stop reason: HOSPADM

## 2022-02-23 RX ORDER — OXYCODONE HYDROCHLORIDE AND ACETAMINOPHEN 5; 325 MG/1; MG/1
1 TABLET ORAL EVERY 6 HOURS PRN
Qty: 6 TABLET | Refills: 0 | Status: SHIPPED | OUTPATIENT
Start: 2022-02-23 | End: 2022-02-26

## 2022-02-23 RX ORDER — OXYCODONE HYDROCHLORIDE AND ACETAMINOPHEN 5; 325 MG/1; MG/1
1 TABLET ORAL ONCE
Status: COMPLETED | OUTPATIENT
Start: 2022-02-23 | End: 2022-02-23

## 2022-02-23 RX ADMIN — OXYCODONE HYDROCHLORIDE AND ACETAMINOPHEN 1 TABLET: 5; 325 TABLET ORAL at 23:14

## 2022-02-23 ASSESSMENT — PAIN SCALES - GENERAL
PAINLEVEL_OUTOF10: 10
PAINLEVEL_OUTOF10: 10

## 2022-02-23 ASSESSMENT — PAIN DESCRIPTION - LOCATION: LOCATION: FOOT

## 2022-02-23 ASSESSMENT — PAIN DESCRIPTION - PAIN TYPE: TYPE: ACUTE PAIN

## 2022-02-23 ASSESSMENT — PAIN DESCRIPTION - ONSET: ONSET: PROGRESSIVE

## 2022-02-23 ASSESSMENT — PAIN DESCRIPTION - PROGRESSION: CLINICAL_PROGRESSION: NOT CHANGED

## 2022-02-23 ASSESSMENT — PAIN DESCRIPTION - DESCRIPTORS: DESCRIPTORS: ACHING

## 2022-02-23 ASSESSMENT — PAIN DESCRIPTION - FREQUENCY: FREQUENCY: CONTINUOUS

## 2022-02-23 ASSESSMENT — PAIN DESCRIPTION - ORIENTATION: ORIENTATION: LEFT

## 2022-02-23 ASSESSMENT — PAIN - FUNCTIONAL ASSESSMENT: PAIN_FUNCTIONAL_ASSESSMENT: ACTIVITIES ARE NOT PREVENTED

## 2022-02-24 NOTE — ED PROVIDER NOTES
CHIEF COMPLAINT  Foot Pain (Pt had his left great toenail removed today and states the doctor forgot to call in pain medicine.)      HISTORY OF PRESENT ILLNESS  Power Mcginnis is a 30 y.o. male presents to the ED with left big toe pain, had his toenail removed today, and when he went to get his prescription for pain medicine picked up, it had not been called in, he states he called their office but they could not get him back until Monday and for unclear reasons could not call in his prescription today, he has had increased pain, tried ibuprofen/Tylenol, and is throbbing, had been oozing blood and he has changed the dressing a few times, no history of diabetes or poor wound healing, no pus drainage, no fevers, requesting something for pain. No other complaints, modifying factors or associated symptoms. I have reviewed the following from the nursing documentation. Past Medical History:   Diagnosis Date    Asthma     Kidney stone     Manic depression ([facility] [address]     Meningitis     April 2011 - treated at [facility]     Past Surgical History:   Procedure Laterality Date    FRACTURE SURGERY       No family history on file.   Social History     Socioeconomic History    Marital status: Single     Spouse name: Not on file    Number of children: Not on file    Years of education: Not on file    Highest education level: Not on file   Occupational History    Not on file   Tobacco Use    Smoking status: Current Every Day Smoker     Packs/day: 0.50     Years: 3.00     Pack years: 1.50     Types: Cigarettes    Smokeless tobacco: Never Used   Vaping Use    Vaping Use: Never used   Substance and Sexual Activity    Alcohol use: No    Drug use: No    Sexual activity: Yes     Partners: Female   Other Topics Concern    Not on file   Social History Narrative    Not on file     Social Determinants of Health     Financial Resource Strain:     Difficulty of Paying Living Expenses: Not on file   Food Insecurity:     Worried About Running Out of Food in the Last Year: Not on file    Ran Out of Food in the Last Year: Not on file   Transportation Needs:     Lack of Transportation (Medical): Not on file    Lack of Transportation (Non-Medical): Not on file   Physical Activity:     Days of Exercise per Week: Not on file    Minutes of Exercise per Session: Not on file   Stress:     Feeling of Stress : Not on file   Social Connections:     Frequency of Communication with Friends and Family: Not on file    Frequency of Social Gatherings with Friends and Family: Not on file    Attends Zoroastrianism Services: Not on file    Active Member of [address] [place] [address] [employer] or Organizations: Not on file    Attends Club or Organization Meetings: Not on file    Marital Status: Not on file   Intimate Partner Violence:     Fear of Current or Ex-Partner: Not on file    Emotionally Abused: Not on file    Physically Abused: Not on file    Sexually Abused: Not on file   Housing Stability:     Unable to Pay for Housing in the Last Year: Not on file    Number of [place] in the Last Year: Not on file    Unstable Housing in the Last Year: Not on file     No current facility-administered medications for this encounter. Current Outpatient Medications   Medication Sig Dispense Refill    oxyCODONE-acetaminophen (PERCOCET) 5-325 MG per tablet Take 1 tablet by mouth every 6 hours as needed for Pain for up to 3 days. Intended supply: 3 days.  Take lowest dose possible to manage pain 6 tablet 0    albuterol sulfate HFA (VENTOLIN HFA) 108 (90 Base) MCG/ACT inhaler Inhale 2 puffs into the lungs 4 times daily as needed for Wheezing 18 g 1    omeprazole (PRILOSEC) 20 MG delayed release capsule Take 1 capsule by mouth 2 times daily (before meals) 60 capsule 2    ondansetron (ZOFRAN ODT) 4 MG disintegrating tablet Take 1-2 tablets by mouth every 8 hours as needed for Nausea 12 tablet 0     Allergies   Allergen Reactions    Sulfa Antibiotics Swelling       REVIEW OF SYSTEMS  10 systems reviewed, pertinent positives per HPI otherwise noted to be negative. PHYSICAL EXAM  BP (!) 144/96   Pulse 80   Temp 98.4 °F (36.9 °C) (Oral)   Resp 17   Ht 5' 11\" (1.803 m)   Wt 289 lb 11.2 oz (131.4 kg)   SpO2 98%   BMI 40.40 kg/m²   GENERAL APPEARANCE: Awake and alert. Cooperative. No acute distress, intermittently moaning in pain  HEAD: Normocephalic. Atraumatic. EYES: PERRL. EOM's grossly intact. ENT: Mucous membranes are moist.  Airway patent, no stridor  NECK: Supple. No rigidity  HEART: RRR. No murmurs  LUNGS: Respirations unlabored. Lungs are clear to auscultation bilaterally. EXTREMITIES: No peripheral edema. Moves all extremities equally. Left lower extremity with complete removal of the left great toenail, healthy appearing granulation tissue with no active bleeding, but moistened from the Vaseline/iodoform dressing, distal sensation intact in all digits, tender to palpation over the distal digit and surrounding the previous toenail site, 2+ DP and PT pulses, less than 2-second cap refill in all digits, able to perform range of motion though painful at the distal tip  SKIN: Warm and dry. No acute rashes. NEUROLOGICAL: Alert and oriented. No gross facial drooping. Normal speech, steady gait, but antalgic, favoring the right due to left great toe pain  PSYCHIATRIC: Normal mood and affect. RADIOLOGY  XR CHEST (2 VW)    Result Date: 2/14/2022  EXAMINATION: TWO XRAY VIEWS OF THE CHEST 2/14/2022 7:06 pm COMPARISON: None. HISTORY: ORDERING SYSTEM PROVIDED HISTORY: asthma, covid in jan 2022, cough congestion FINDINGS: Heart size is normal. No focal consolidation in the lungs. No pleural effusion or pneumothorax. No acute abnormality. PROCEDURE:  DIGITAL BLOCK  Vera Louis or their surrogate had an opportunity to ask questions, and the risks, benefits, and alternatives were discussed. The injection site was prepped to maintain a sterile field.  A local anesthetic 0.5% bupivacaine 3 cc was used to completely anesthetize the digital nerves of the left great toe. There were no complications during the procedure. Tiny Padilla had improvement in pain and no complications, EBL 0.    ED COURSE/MDM  Patient seen and evaluated. Old records reviewed. 44-year-old male left great toe pain, he was very vocal about his pain in the ED, patient became agitated when I explained I cannot give him 5 days worth of opioid pain medication because his podiatrist office told him they could not get him back in until Monday and had not called in his prescription for pain medication, I also expressed my concern that he had just picked up codeine cough syrup yesterday is also controlled substance, I offered to call his podiatrist myself in the morning but he could not think of their name, even offered him to go home and check his paperwork and call back here to the ER to let me know, but we did not receive a call back overnight, patient did accept a digital block care, and Percocet, I offered to give him a prescription for 6 Percocet for home but any further pain meds he would need to get in touch with his podiatrist, encouraged ibuprofen/Tylenol as well, also encouraged primary care follow-up, blood pressure was a little elevated, bleeding controlled here, given Surgifoam for home in case oozing continued, the surgical site of toenail removal looked clean and without signs of infection, neurovascularly intact, wound cleaned and dressing replaced, strict return precautions given, all questions answered, will return if any worsening symptoms or new concerns, see AVS for further discharge information, patient verbalized understanding of plan, felt comfortable going home.       Orders Placed This Encounter   Procedures    Referral for No Primary Care Physician - Urgent     Orders Placed This Encounter   Medications    oxyCODONE-acetaminophen (PERCOCET) 5-325 MG per tablet 1 tablet    oxyCODONE-acetaminophen (PERCOCET) 5-325 MG per tablet     Sig: Take 1 tablet by mouth every 6 hours as needed for Pain for up to 3 days. Intended supply: 3 days. Take lowest dose possible to manage pain     Dispense:  6 tablet     Refill:  0    DISCONTD: gelatin adsorbable (GELFOAM) sponge 1 each    DISCONTD: bupivacaine (PF) (MARCAINE) 0.5 % injection 150 mg          CLINICAL IMPRESSION  1. Pain of toe of left foot    2. H/O toe surgery    3. Elevated blood pressure reading        Blood pressure (!) 144/96, pulse 80, temperature 98.4 °F (36.9 °C), temperature source Oral, resp. rate 17, height 5' 11\" (1.803 m), weight 289 lb 11.2 oz (131.4 kg), SpO2 98 %. DISPOSITION  Santy Louis was discharged to home in stable condition.                    Reed Alves,   03/01/22 3070

## 2022-03-05 ENCOUNTER — APPOINTMENT (OUTPATIENT)
Dept: CT IMAGING | Age: 37
End: 2022-03-05
Payer: MEDICAID

## 2022-03-05 ENCOUNTER — HOSPITAL ENCOUNTER (EMERGENCY)
Age: 37
Discharge: HOME | End: 2022-03-05
Attending: EMERGENCY MEDICINE
Payer: MEDICAID

## 2022-03-05 VITALS
SYSTOLIC BLOOD PRESSURE: 160 MMHG | RESPIRATION RATE: 18 BRPM | TEMPERATURE: 97.8 F | BODY MASS INDEX: 36.4 KG/M2 | WEIGHT: 260 LBS | DIASTOLIC BLOOD PRESSURE: 98 MMHG | OXYGEN SATURATION: 100 % | HEART RATE: 71 BPM | HEIGHT: 71 IN

## 2022-03-05 DIAGNOSIS — R51.9 NONINTRACTABLE HEADACHE, UNSPECIFIED CHRONICITY PATTERN, UNSPECIFIED HEADACHE TYPE: Primary | ICD-10-CM

## 2022-03-05 PROCEDURE — 96374 THER/PROPH/DIAG INJ IV PUSH: CPT

## 2022-03-05 PROCEDURE — 96375 TX/PRO/DX INJ NEW DRUG ADDON: CPT

## 2022-03-05 PROCEDURE — 6360000002 HC RX W HCPCS: Performed by: EMERGENCY MEDICINE

## 2022-03-05 PROCEDURE — 2580000003 HC RX 258: Performed by: EMERGENCY MEDICINE

## 2022-03-05 PROCEDURE — 70450 CT HEAD/BRAIN W/O DYE: CPT

## 2022-03-05 PROCEDURE — 99284 EMERGENCY DEPT VISIT MOD MDM: CPT

## 2022-03-05 RX ORDER — METOCLOPRAMIDE HYDROCHLORIDE 5 MG/ML
10 INJECTION INTRAMUSCULAR; INTRAVENOUS ONCE
Status: COMPLETED | OUTPATIENT
Start: 2022-03-05 | End: 2022-03-05

## 2022-03-05 RX ORDER — SODIUM CHLORIDE, SODIUM LACTATE, POTASSIUM CHLORIDE, AND CALCIUM CHLORIDE .6; .31; .03; .02 G/100ML; G/100ML; G/100ML; G/100ML
1000 INJECTION, SOLUTION INTRAVENOUS ONCE
Status: COMPLETED | OUTPATIENT
Start: 2022-03-05 | End: 2022-03-05

## 2022-03-05 RX ORDER — DIPHENHYDRAMINE HYDROCHLORIDE 50 MG/ML
25 INJECTION INTRAMUSCULAR; INTRAVENOUS ONCE
Status: COMPLETED | OUTPATIENT
Start: 2022-03-05 | End: 2022-03-05

## 2022-03-05 RX ORDER — KETOROLAC TROMETHAMINE 30 MG/ML
15 INJECTION, SOLUTION INTRAMUSCULAR; INTRAVENOUS ONCE
Status: COMPLETED | OUTPATIENT
Start: 2022-03-05 | End: 2022-03-05

## 2022-03-05 RX ADMIN — DIPHENHYDRAMINE HYDROCHLORIDE 25 MG: 50 INJECTION, SOLUTION INTRAMUSCULAR; INTRAVENOUS at 07:34

## 2022-03-05 RX ADMIN — METOCLOPRAMIDE HYDROCHLORIDE 10 MG: 5 INJECTION INTRAMUSCULAR; INTRAVENOUS at 07:35

## 2022-03-05 RX ADMIN — SODIUM CHLORIDE, POTASSIUM CHLORIDE, SODIUM LACTATE AND CALCIUM CHLORIDE 1000 ML: 600; 310; 30; 20 INJECTION, SOLUTION INTRAVENOUS at 07:35

## 2022-03-05 RX ADMIN — KETOROLAC TROMETHAMINE 15 MG: 30 INJECTION, SOLUTION INTRAMUSCULAR; INTRAVENOUS at 07:54

## 2022-03-05 ASSESSMENT — PAIN DESCRIPTION - PAIN TYPE: TYPE: ACUTE PAIN

## 2022-03-05 ASSESSMENT — PAIN DESCRIPTION - FREQUENCY: FREQUENCY: CONTINUOUS

## 2022-03-05 ASSESSMENT — PAIN DESCRIPTION - DESCRIPTORS: DESCRIPTORS: THROBBING;POUNDING

## 2022-03-05 ASSESSMENT — PAIN DESCRIPTION - LOCATION: LOCATION: HEAD

## 2022-03-05 ASSESSMENT — PAIN SCALES - GENERAL: PAINLEVEL_OUTOF10: 8

## 2022-03-05 NOTE — ED NOTES
Patient given  discharge instructions verbal and written, patient verbalized understanding. Alert/oriented X4, Clear speech.   Patient exhibits no distress, ambulates with steady gait per self leaving unit, no further request.     Lashaun Lujan RN  03/05/22 6018

## 2022-03-05 NOTE — Clinical Note
Betty Hodgkin was seen and treated in our emergency department on 3/5/2022. He may return to work on 03/06/2022. If you have any questions or concerns, please don't hesitate to call.       Sahra Villagran MD

## 2022-03-05 NOTE — ED PROVIDER NOTES
CHIEF COMPLAINT  Headache      HISTORY OF PRESENT ILLNESS  Petty Tyson is a 30 y.o. male with a history of asthma and kidney stone, manic depression who presents emerge department for evaluation of headache. Patient states that about an hour ago he had the onset of a bilateral frontal headache. He was awake when the headache started. he states the pain is 10 out of 10. He states that he is mildly nauseous. He denies prior history of subarachnoid hemorrhage. He denies any weakness in the arms or legs, facial numbness, facial asymmetry, speech changes. He states that he tried some over-the-counter medicine for the headache and this did not improve. He denies any recent falls, head injuries. He states that he has been having migraine headaches ever since his Covid diagnosis a few months ago. Denies fever  Past Medical History:   Diagnosis Date    Asthma     Kidney stone     [facility] [address]     Meningitis     April 2011 - treated at [facility]     Past Surgical History:   Procedure Laterality Date    FRACTURE SURGERY       History reviewed. No pertinent family history.   Social History     Socioeconomic History    Marital status: Single     Spouse name: Not on file    Number of children: Not on file    Years of education: Not on file    Highest education level: Not on file   Occupational History    Not on file   Tobacco Use    Smoking status: Current Every Day Smoker     Packs/day: 0.50     Years: 3.00     Pack years: 1.50     Types: Cigarettes    Smokeless tobacco: Never Used   Vaping Use    Vaping Use: Never used   Substance and Sexual Activity    Alcohol use: No    Drug use: No    Sexual activity: Yes     Partners: Female   Other Topics Concern    Not on file   Social History Narrative    Not on file     Social Determinants of Health     Financial Resource Strain:     Difficulty of Paying Living Expenses: Not on file   Food Insecurity:     Worried About Running Out of Food in the Last Year: Not on file    Ran Out of Food in the Last Year: Not on file   Transportation Needs:     Lack of Transportation (Medical): Not on file    Lack of Transportation (Non-Medical): Not on file   Physical Activity:     Days of Exercise per Week: Not on file    Minutes of Exercise per Session: Not on file   Stress:     Feeling of Stress : Not on file   Social Connections:     Frequency of Communication with Friends and Family: Not on file    Frequency of Social Gatherings with Friends and Family: Not on file    Attends [employer] Services: Not on file    Active Member of [address] [place] [address] [employer] or Organizations: Not on file    Attends Club or Organization Meetings: Not on file    Marital Status: Not on file   Intimate Partner Violence:     Fear of Current or Ex-Partner: Not on file    Emotionally Abused: Not on file    Physically Abused: Not on file    Sexually Abused: Not on file   Housing Stability:     Unable to Pay for Housing in the Last Year: Not on file    Number of [place] in the Last Year: Not on file    Unstable Housing in the Last Year: Not on file     No current facility-administered medications for this encounter. Current Outpatient Medications   Medication Sig Dispense Refill    albuterol sulfate HFA (VENTOLIN HFA) 108 (90 Base) MCG/ACT inhaler Inhale 2 puffs into the lungs 4 times daily as needed for Wheezing 18 g 1     Allergies   Allergen Reactions    Sulfa Antibiotics Swelling       REVIEW OF SYSTEMS  Positive and pertinent negatives as per HPI. All other systems were reviewed and are negative. PHYSICAL EXAM  BP (!) 160/98   Pulse 71   Temp 97.8 °F (36.6 °C) (Oral)   Resp 18   Ht 5' 11\" (1.803 m)   Wt 260 lb (117.9 kg)   SpO2 100%   BMI 36.26 kg/m²   GENERAL APPEARANCE: Awake and alert. Cooperative. HEAD: Normocephalic. Atraumatic. T  EYES: PERRL. EOM's grossly intact. ENT: Mucous membranes are moist.   NECK: Supple, trachea midline.  No significant lymphadenopathy, no meningismus  HEART: RRR. No harsh murmurs. Intact radial pulses 2+ bilaterally. LUNGS: Respirations unlabored without accessory muscle use. CTAB. Good air exchange. No wheezes, rales, or rhonchi. Speaking comfortably in full sentences. ABDOMEN: Soft. Non-distended. Non-tender. No guarding or rebound. EXTREMITIES: No peripheral edema. No acute deformities. SKIN: Warm and dry. No acute rashes. NEUROLOGICAL: Neuro:  CN I not assessed. Pupils are equal, round and reactive to light bilaterally. Extraocular motions are full and intact bilaterally. Facial sensation to light touch in the trigeminal distribution is intact bilaterally. Patient able to raise bilateral eyebrows. Symmetric smile. Tongue protrudes midline. Able to puff out cheeks bilaterally. Shrugs shoulders bilaterally. Speech is clear and fluent. No difficulty with word finding. Strength is 5/5 in the bilateral upper extremities (flexion and extension at elbow, wrist, intact and strong  of the hands). Strength is 5/5 in the bilateral lower extremities (flexion and extension at hip, knee and plantarflexion and dorsiflexion at the ankle), intact sensation to light touch in all four extremities. There is no pronator drift. Finger to nose testing intact. Gait is stable. NIHSS: 0    LABS  I have reviewed all labs for this visit. No results found for this visit on 03/05/22. RADIOLOGY  X-RAYS:  I have reviewed radiologic plain film image(s). ALL OTHER NON-PLAIN FILM IMAGES SUCH AS CT, ULTRASOUND AND MRI HAVE BEEN READ BY THE RADIOLOGIST. CT Head WO Contrast   Final Result      No acute intracranial abnormality. ED COURSE/MDM  Patient seen and evaluated. Old records reviewed. Labs and imaging reviewed and results discussed with patient. 24-year-old male presenting for evaluation of headache that was fairly sudden in onset, complaining 10 out of 10 pain. He arrives hypertensive to 160/98.   No focal neurological deficits, NIH stroke scale of 0. He does appear to be photosensitive. ED evaluation will include CT head to rule out subarachnoid hemorrhage as headache started about 1 hour ago and this will be sufficient to rule out subarachnoid hemorrhage. Patient will be given IV fluid bolus, Reglan, Benadryl and Toradol pending CT head results. On review of the patient's medical record, patient has been in multiple emergency departments over the past several weeks with multiple different narcotic prescriptions. Advised that narcotic medicine is not used to treat migraine headaches and he may actually have rebound headaches from this type of medicine. He was advised to follow-up with PCP in regards to his elevated blood pressure reading and for continued management and monitoring of potential migraine disorder. CT of the head reveals no acute process. He has a stable gait. He reports symptomatic improvement after migraine cocktail. Patient was advised to keep a log of his headaches to be able to help potentially diagnose migraine disorder. Advised on Tylenol, ibuprofen for management of headaches in the meantime. The patient will be discharged from the emergency department. The patient was counseled on their diagnosis and any medications prescribed. They were advised on the need for PCP followup. They were counseled on the need to return to the emergency department if any of their symptoms were to worsen, change or have any other concerns. Discharged in stable condition. CLINICAL IMPRESSION  1. Nonintractable headache, unspecified chronicity pattern, unspecified headache type        Blood pressure (!) 160/98, pulse 71, temperature 97.8 °F (36.6 °C), temperature source Oral, resp. rate 18, height 5' 11\" (1.803 m), weight 260 lb (117.9 kg), SpO2 100 %. DISPOSITION  Santy Louis was discharged to home in stable condition.      This chart was generated in part by using Dragon Dictation system and may contain errors related to that system including errors in grammar, punctuation, and spelling, as well as words and phrases that may be inappropriate. If there are any questions or concerns please feel free to contact the dictating provider for clarification.      Celestine Medrano MD  03/05/22 6834

## 2022-06-05 ENCOUNTER — HOSPITAL ENCOUNTER (EMERGENCY)
Age: 37
Discharge: HOME | End: 2022-06-06
Attending: STUDENT IN AN ORGANIZED HEALTH CARE EDUCATION/TRAINING PROGRAM
Payer: MEDICAID

## 2022-06-05 ENCOUNTER — APPOINTMENT (OUTPATIENT)
Dept: GENERAL RADIOLOGY | Age: 37
End: 2022-06-05
Payer: MEDICAID

## 2022-06-05 VITALS
RESPIRATION RATE: 18 BRPM | OXYGEN SATURATION: 98 % | DIASTOLIC BLOOD PRESSURE: 104 MMHG | TEMPERATURE: 98.1 F | HEART RATE: 67 BPM | SYSTOLIC BLOOD PRESSURE: 186 MMHG

## 2022-06-05 DIAGNOSIS — M79.675 PAIN OF TOE OF LEFT FOOT: Primary | ICD-10-CM

## 2022-06-05 PROCEDURE — 6370000000 HC RX 637 (ALT 250 FOR IP): Performed by: STUDENT IN AN ORGANIZED HEALTH CARE EDUCATION/TRAINING PROGRAM

## 2022-06-05 PROCEDURE — 73630 X-RAY EXAM OF FOOT: CPT

## 2022-06-05 PROCEDURE — 99283 EMERGENCY DEPT VISIT LOW MDM: CPT

## 2022-06-05 RX ORDER — OXYCODONE HYDROCHLORIDE 5 MG/1
5 TABLET ORAL ONCE
Status: COMPLETED | OUTPATIENT
Start: 2022-06-05 | End: 2022-06-05

## 2022-06-05 RX ADMIN — OXYCODONE 5 MG: 5 TABLET ORAL at 23:55

## 2022-06-05 ASSESSMENT — PAIN DESCRIPTION - PAIN TYPE: TYPE: ACUTE PAIN

## 2022-06-05 ASSESSMENT — PAIN SCALES - GENERAL: PAINLEVEL_OUTOF10: 10

## 2022-06-05 ASSESSMENT — PAIN - FUNCTIONAL ASSESSMENT: PAIN_FUNCTIONAL_ASSESSMENT: 0-10

## 2022-06-05 ASSESSMENT — PAIN DESCRIPTION - LOCATION: LOCATION: TOE (COMMENT WHICH ONE)

## 2022-06-05 ASSESSMENT — PAIN DESCRIPTION - ORIENTATION: ORIENTATION: RIGHT;LEFT

## 2022-06-05 NOTE — Clinical Note
Kavita Resendiz was seen and treated in our emergency department on 6/5/2022. He may return to work on 06/07/2022. Mr Priyanka Lay was seen and evaluated in the emergency department at [facility]. on 6/6/2022. Please facilitate his follow-up with doctor which is necessary for his medical care. If you have any questions or concerns, please don't hesitate to call.       Chauncey Castanon MD

## 2022-06-06 RX ORDER — IBUPROFEN 800 MG/1
800 TABLET ORAL EVERY 8 HOURS PRN
Qty: 30 TABLET | Refills: 0 | Status: SHIPPED | OUTPATIENT
Start: 2022-06-06

## 2022-06-06 RX ORDER — ACETAMINOPHEN 500 MG
500 TABLET ORAL
Qty: 15 TABLET | Refills: 5 | Status: SHIPPED | OUTPATIENT
Start: 2022-06-06
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
## 2022-06-06 NOTE — ED NOTES
Pt left before getting prescriptions attempted to call to let pt know about prescriptions the phone number was disconnected. Will place scripts up at charge nurse desk incase pt calls back.       Charlee Cardenas RN  06/06/22 9179

## 2022-12-11 ENCOUNTER — APPOINTMENT (OUTPATIENT)
Dept: GENERAL RADIOLOGY | Age: 37
End: 2022-12-11
Payer: MEDICAID

## 2022-12-11 ENCOUNTER — HOSPITAL ENCOUNTER (EMERGENCY)
Age: 37
Discharge: HOME | End: 2022-12-11
Attending: EMERGENCY MEDICINE
Payer: MEDICAID

## 2022-12-11 VITALS
HEIGHT: 70 IN | HEART RATE: 86 BPM | RESPIRATION RATE: 16 BRPM | BODY MASS INDEX: 40.23 KG/M2 | DIASTOLIC BLOOD PRESSURE: 65 MMHG | OXYGEN SATURATION: 96 % | TEMPERATURE: 98.4 F | WEIGHT: 281 LBS | SYSTOLIC BLOOD PRESSURE: 140 MMHG

## 2022-12-11 DIAGNOSIS — S60.221A CONTUSION OF RIGHT HAND, INITIAL ENCOUNTER: ICD-10-CM

## 2022-12-11 DIAGNOSIS — S42.144A CLOSED NONDISPLACED FRACTURE OF GLENOID CAVITY OF RIGHT SCAPULA, INITIAL ENCOUNTER: Primary | ICD-10-CM

## 2022-12-11 PROCEDURE — 73030 X-RAY EXAM OF SHOULDER: CPT

## 2022-12-11 PROCEDURE — 73130 X-RAY EXAM OF HAND: CPT

## 2022-12-11 PROCEDURE — 6370000000 HC RX 637 (ALT 250 FOR IP): Performed by: EMERGENCY MEDICINE

## 2022-12-11 PROCEDURE — 99283 EMERGENCY DEPT VISIT LOW MDM: CPT

## 2022-12-11 RX ORDER — IBUPROFEN 800 MG/1
800 TABLET ORAL ONCE
Status: COMPLETED | OUTPATIENT
Start: 2022-12-11 | End: 2022-12-11

## 2022-12-11 RX ORDER — IBUPROFEN 800 MG/1
800 TABLET ORAL EVERY 8 HOURS PRN
Qty: 20 TABLET | Refills: 0 | Status: SHIPPED | OUTPATIENT
Start: 2022-12-11 | End: 2022-12-21

## 2022-12-11 RX ORDER — OXYCODONE HYDROCHLORIDE AND ACETAMINOPHEN 5; 325 MG/1; MG/1
1 TABLET ORAL ONCE
Status: COMPLETED | OUTPATIENT
Start: 2022-12-11 | End: 2022-12-11

## 2022-12-11 RX ORDER — HYDROCODONE BITARTRATE AND ACETAMINOPHEN 5; 325 MG/1; MG/1
1 TABLET ORAL EVERY 6 HOURS PRN
Qty: 12 TABLET | Refills: 0 | Status: SHIPPED | OUTPATIENT
Start: 2022-12-11 | End: 2022-12-14 | Stop reason: ALTCHOICE

## 2022-12-11 RX ORDER — METHOCARBAMOL 500 MG/1
1000 TABLET, FILM COATED ORAL ONCE
Status: COMPLETED | OUTPATIENT
Start: 2022-12-11 | End: 2022-12-11

## 2022-12-11 RX ORDER — METHOCARBAMOL 750 MG/1
750 TABLET, FILM COATED ORAL 3 TIMES DAILY
Qty: 20 TABLET | Refills: 0 | Status: SHIPPED | OUTPATIENT
Start: 2022-12-11 | End: 2022-12-16

## 2022-12-11 RX ADMIN — IBUPROFEN 800 MG: 800 TABLET, FILM COATED ORAL at 18:53

## 2022-12-11 RX ADMIN — OXYCODONE AND ACETAMINOPHEN 1 TABLET: 5; 325 TABLET ORAL at 19:33

## 2022-12-11 RX ADMIN — METHOCARBAMOL 1000 MG: 500 TABLET ORAL at 18:53

## 2022-12-11 ASSESSMENT — PAIN DESCRIPTION - PAIN TYPE: TYPE: ACUTE PAIN

## 2022-12-11 ASSESSMENT — PAIN SCALES - GENERAL
PAINLEVEL_OUTOF10: 10
PAINLEVEL_OUTOF10: 10

## 2022-12-11 ASSESSMENT — PAIN - FUNCTIONAL ASSESSMENT
PAIN_FUNCTIONAL_ASSESSMENT: 0-10
PAIN_FUNCTIONAL_ASSESSMENT: ACTIVITIES ARE NOT PREVENTED

## 2022-12-11 ASSESSMENT — PAIN DESCRIPTION - FREQUENCY: FREQUENCY: CONTINUOUS

## 2022-12-11 ASSESSMENT — PAIN DESCRIPTION - LOCATION: LOCATION: SHOULDER;HAND

## 2022-12-11 ASSESSMENT — PAIN DESCRIPTION - DESCRIPTORS: DESCRIPTORS: DISCOMFORT

## 2022-12-11 ASSESSMENT — PAIN DESCRIPTION - ORIENTATION: ORIENTATION: RIGHT
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
## 2022-12-11 NOTE — Clinical Note
Oscar Martin was seen and treated in our emergency department on 12/11/2022. He may return to work on 12/15/2022. With limited use of right arm until cleared by orthopedics. If you have any questions or concerns, please don't hesitate to call.       Shelbie Vásquez MD

## 2022-12-12 NOTE — DISCHARGE INSTRUCTIONS
Xrays shows a probable fracture to cup area of your shoulder blade wear your arm goes into shoulder socket. Rotator cuff tears cannot be seen on xrays and this could be present also. Followup with orthopedics, MRI or other further imaging may be necessary. Alternate ice and heat.  Shoulder immobilizer

## 2022-12-14 ENCOUNTER — HOSPITAL ENCOUNTER (EMERGENCY)
Age: 37
Discharge: HOME | End: 2022-12-15
Attending: EMERGENCY MEDICINE
Payer: MEDICAID

## 2022-12-14 VITALS
BODY MASS INDEX: 39.45 KG/M2 | RESPIRATION RATE: 10 BRPM | TEMPERATURE: 98 F | HEART RATE: 89 BPM | WEIGHT: 281.8 LBS | DIASTOLIC BLOOD PRESSURE: 98 MMHG | OXYGEN SATURATION: 100 % | SYSTOLIC BLOOD PRESSURE: 176 MMHG | HEIGHT: 71 IN

## 2022-12-14 DIAGNOSIS — S42.141D CLOSED DISPLACED FRACTURE OF GLENOID CAVITY OF RIGHT SCAPULA WITH ROUTINE HEALING, SUBSEQUENT ENCOUNTER: Primary | ICD-10-CM

## 2022-12-14 PROCEDURE — 6370000000 HC RX 637 (ALT 250 FOR IP): Performed by: EMERGENCY MEDICINE

## 2022-12-14 PROCEDURE — 99283 EMERGENCY DEPT VISIT LOW MDM: CPT

## 2022-12-14 RX ORDER — OXYCODONE HYDROCHLORIDE AND ACETAMINOPHEN 5; 325 MG/1; MG/1
1 TABLET ORAL ONCE
Status: COMPLETED | OUTPATIENT
Start: 2022-12-14 | End: 2022-12-14

## 2022-12-14 RX ORDER — OXYCODONE HYDROCHLORIDE AND ACETAMINOPHEN 5; 325 MG/1; MG/1
1 TABLET ORAL EVERY 8 HOURS PRN
Qty: 20 TABLET | Refills: 0 | Status: SHIPPED | OUTPATIENT
Start: 2022-12-14 | End: 2022-12-21

## 2022-12-14 RX ADMIN — OXYCODONE AND ACETAMINOPHEN 1 TABLET: 5; 325 TABLET ORAL at 23:40

## 2022-12-14 ASSESSMENT — PAIN DESCRIPTION - LOCATION
LOCATION: SHOULDER
LOCATION: SHOULDER

## 2022-12-14 ASSESSMENT — PAIN DESCRIPTION - PAIN TYPE: TYPE: ACUTE PAIN

## 2022-12-14 ASSESSMENT — PAIN - FUNCTIONAL ASSESSMENT: PAIN_FUNCTIONAL_ASSESSMENT: 0-10

## 2022-12-14 ASSESSMENT — PAIN DESCRIPTION - ORIENTATION
ORIENTATION: RIGHT
ORIENTATION: RIGHT

## 2022-12-14 ASSESSMENT — PAIN SCALES - GENERAL
PAINLEVEL_OUTOF10: 10
PAINLEVEL_OUTOF10: 10

## 2022-12-14 ASSESSMENT — PAIN DESCRIPTION - DESCRIPTORS: DESCRIPTORS: SHARP;THROBBING

## 2022-12-15 NOTE — DISCHARGE INSTRUCTIONS
Continue motrin and muscle relaxant. Continue to alternate ice and heat. Gentle range of motion exercises.

## 2022-12-15 NOTE — ED NOTES
Patient prepared for and ready to be discharged. Patient discharged at this time in no acute distress after verbalizing understanding of discharge instructions. Patient left after receiving After Visit Summary instructions.         Sarita Ashraf RN  12/15/22 0000

## 2022-12-21 ENCOUNTER — TELEPHONE (OUTPATIENT)
Dept: ORTHOPEDIC SURGERY | Age: 37
End: 2022-12-21

## 2022-12-21 ENCOUNTER — OFFICE VISIT (OUTPATIENT)
Dept: ORTHOPEDIC SURGERY | Age: 37
End: 2022-12-21

## 2022-12-21 VITALS — HEIGHT: 71 IN | WEIGHT: 281 LBS | BODY MASS INDEX: 39.34 KG/M2

## 2022-12-21 DIAGNOSIS — M25.511 ACUTE PAIN OF RIGHT SHOULDER: ICD-10-CM

## 2022-12-21 DIAGNOSIS — S42.91XA: Primary | ICD-10-CM

## 2022-12-21 RX ORDER — OXYCODONE HYDROCHLORIDE 5 MG/1
5 TABLET ORAL EVERY 8 HOURS PRN
Qty: 21 TABLET | Refills: 0 | Status: SHIPPED | OUTPATIENT
Start: 2022-12-21 | End: 2022-12-28
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
## 2022-12-21 NOTE — TELEPHONE ENCOUNTER
Isaac Clark # [id number] - VALID TO 02/04/23 RIGHT SHOULDER [facility]       Patient notified/ Follow up in office to review CT     jm

## 2022-12-27 ENCOUNTER — TELEPHONE (OUTPATIENT)
Dept: ORTHOPEDIC SURGERY | Age: 37
End: 2022-12-27

## 2022-12-27 NOTE — TELEPHONE ENCOUNTER
RC/ patient does not answer phone     No m,message received last week     If patient calls back - was approved on 12/21/2022    jm

## 2022-12-27 NOTE — TELEPHONE ENCOUNTER
General Question     Subject: Pt HASN'T HEARD BACK ON CT SCAN YET   Patient and /or Facility Request: Eladio Party  Contact Number: [phone]    Pt UPSET THAT HE PUT IN MESSAGE LAST WEEK AND DIDN'T HEAR BACK. ADVISED THAT PROVIDER IS RUNNING CLINIC NOW, BUT SOMEONE WILL CALL WHEN THEY CAN. Pt STATES PAIN IS WORSE AND HE WOULD LIKE TO SPEAK TO SOMEONE ASAP @ THE # ABOVE.

## 2022-12-27 NOTE — TELEPHONE ENCOUNTER
General Question     Subject: PATIENT REQUESTING A CALL ABOUT GETTING HIS CT SCAN.   Patient: Jessica Ziegler Number: [phone]

## 2022-12-28 NOTE — TELEPHONE ENCOUNTER
Spoke with patient  - was transferred in from Call Center    [facility] Denied    Patient hung up     jm

## 2022-12-29 ENCOUNTER — HOSPITAL ENCOUNTER (OUTPATIENT)
Dept: CT IMAGING | Age: 37
Discharge: HOME | End: 2022-12-29
Payer: MEDICAID

## 2022-12-29 DIAGNOSIS — S42.91XA: ICD-10-CM

## 2022-12-29 PROCEDURE — 73200 CT UPPER EXTREMITY W/O DYE: CPT

## 2022-12-30 ENCOUNTER — TELEPHONE (OUTPATIENT)
Dept: ORTHOPEDIC SURGERY | Age: 37
End: 2022-12-30

## 2022-12-30 NOTE — TELEPHONE ENCOUNTER
General Question     Subject: REQUESTING A CALL ABOUT HIS CT SCAN RESULT.   Patient: Rafael [place] Number: [phone]

## 2023-01-04 ENCOUNTER — OFFICE VISIT (OUTPATIENT)
Dept: ORTHOPEDIC SURGERY | Age: 38
End: 2023-01-04
Payer: MEDICAID

## 2023-01-04 VITALS — WEIGHT: 281 LBS | HEIGHT: 71 IN | BODY MASS INDEX: 39.34 KG/M2

## 2023-01-04 DIAGNOSIS — S42.91XA: Primary | ICD-10-CM

## 2023-01-04 PROCEDURE — 4004F PT TOBACCO SCREEN RCVD TLK: CPT | Performed by: PHYSICIAN ASSISTANT

## 2023-01-04 PROCEDURE — 99214 OFFICE O/P EST MOD 30 MIN: CPT | Performed by: PHYSICIAN ASSISTANT

## 2023-01-04 PROCEDURE — G8417 CALC BMI ABV UP PARAM F/U: HCPCS | Performed by: PHYSICIAN ASSISTANT

## 2023-01-04 PROCEDURE — G8484 FLU IMMUNIZE NO ADMIN: HCPCS | Performed by: PHYSICIAN ASSISTANT

## 2023-01-04 PROCEDURE — G8427 DOCREV CUR MEDS BY ELIG CLIN: HCPCS | Performed by: PHYSICIAN ASSISTANT

## 2023-01-04 RX ORDER — IBUPROFEN 800 MG/1
800 TABLET ORAL EVERY 8 HOURS PRN
Qty: 20 TABLET | Refills: 0 | Status: SHIPPED | OUTPATIENT
Start: 2023-01-04 | End: 2023-01-14
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
## 2023-01-11 ENCOUNTER — OFFICE VISIT (OUTPATIENT)
Dept: ORTHOPEDIC SURGERY | Age: 38
End: 2023-01-11

## 2023-01-11 DIAGNOSIS — S42.91XA: Primary | ICD-10-CM

## 2023-01-11 RX ORDER — TRAMADOL HYDROCHLORIDE 50 MG/1
50 TABLET ORAL EVERY 6 HOURS PRN
Qty: 28 TABLET | Refills: 0 | Status: SHIPPED
Start: 2023-01-11 | End: 2023-01-12 | Stop reason: ALTCHOICE
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
## 2023-01-12 ENCOUNTER — TELEPHONE (OUTPATIENT)
Dept: ORTHOPEDIC SURGERY | Age: 38
End: 2023-01-12

## 2023-01-12 ENCOUNTER — OFFICE VISIT (OUTPATIENT)
Dept: ORTHOPEDIC SURGERY | Age: 38
End: 2023-01-12
Payer: MEDICAID

## 2023-01-12 VITALS — HEIGHT: 71 IN | WEIGHT: 281 LBS | BODY MASS INDEX: 39.34 KG/M2

## 2023-01-12 DIAGNOSIS — S42.91XA: Primary | ICD-10-CM

## 2023-01-12 DIAGNOSIS — S43.014A ANTERIOR DISLOCATION OF RIGHT SHOULDER, INITIAL ENCOUNTER: ICD-10-CM

## 2023-01-12 PROCEDURE — G8484 FLU IMMUNIZE NO ADMIN: HCPCS | Performed by: ORTHOPAEDIC SURGERY

## 2023-01-12 PROCEDURE — 4004F PT TOBACCO SCREEN RCVD TLK: CPT | Performed by: ORTHOPAEDIC SURGERY

## 2023-01-12 PROCEDURE — G8427 DOCREV CUR MEDS BY ELIG CLIN: HCPCS | Performed by: ORTHOPAEDIC SURGERY

## 2023-01-12 PROCEDURE — L3670 SO ACRO/CLAV CAN WEB PRE OTS: HCPCS | Performed by: ORTHOPAEDIC SURGERY

## 2023-01-12 PROCEDURE — G8417 CALC BMI ABV UP PARAM F/U: HCPCS | Performed by: ORTHOPAEDIC SURGERY

## 2023-01-12 PROCEDURE — 99214 OFFICE O/P EST MOD 30 MIN: CPT | Performed by: ORTHOPAEDIC SURGERY

## 2023-01-12 RX ORDER — HYDROCODONE BITARTRATE AND ACETAMINOPHEN 5; 325 MG/1; MG/1
1 TABLET ORAL EVERY 4 HOURS PRN
Qty: 20 TABLET | Refills: 0 | Status: SHIPPED | OUTPATIENT
Start: 2023-01-12 | End: 2023-01-15

## 2023-01-12 RX ORDER — HYDROCODONE BITARTRATE AND ACETAMINOPHEN 5; 325 MG/1; MG/1
TABLET ORAL
Status: ON HOLD | COMMUNITY
Start: 2023-01-12 | End: 2023-01-16 | Stop reason: HOSPADM

## 2023-01-12 RX ORDER — HYDROCODONE BITARTRATE AND ACETAMINOPHEN 5; 325 MG/1; MG/1
1 TABLET ORAL EVERY 4 HOURS PRN
Qty: 20 TABLET | Refills: 0 | Status: SHIPPED | OUTPATIENT
Start: 2023-01-12 | End: 2023-01-12

## 2023-01-12 RX ORDER — OXYCODONE HYDROCHLORIDE 5 MG/1
TABLET ORAL
COMMUNITY
Start: 2023-01-06 | End: 2023-01-12

## 2023-01-12 NOTE — PROGRESS NOTES
Chief Complaint    Shoulder Pain (NP LEFT SHOULDER)      History of Present Illness:  Hiram Rueda is a pleasant, left-hand-dominant 41 y.o. male here today on referral from Dr. Pennie Tiwari for evaluation of right shoulder. Mr. To Fishman reports that he suffered a right shoulder dislocation after a fall onto his outstretched hand. The injury occurred about 6 weeks ago. He underwent closed reduction in an ED and has been wearing a sling. He has seen multiple providers and feels that he is being bounced around. He has been unable to work since the injury. He works in truck inspection. He states that he smokes approximately half pack per day. He denies any previous history of dislocations. He has some vague numbness and tingling. Medical History:    No notes on file    Patient's medications, allergies, past medical, surgical, social and family histories were reviewed and updated as appropriate.     Past Medical History:   Diagnosis Date    Asthma     Kidney stone     [facility]     Meningitis     April 2011 - treated at [facility]      Social History     Socioeconomic History    Marital status: Single     Spouse name: Not on file    Number of children: Not on file    Years of education: Not on file    Highest education level: Not on file   Occupational History    Not on file   Tobacco Use    Smoking status: Every Day     Packs/day: 0.50     Years: 3.00     Pack years: 1.50     Types: Cigarettes    Smokeless tobacco: Never   Vaping Use    Vaping Use: Never used   Substance and Sexual Activity    Alcohol use: No    Drug use: No    Sexual activity: Yes     Partners: Female   Other Topics Concern    Not on file   Social History Narrative    Not on file     Social Determinants of Health     Financial Resource Strain: Not on file   Food Insecurity: Not on file   Transportation Needs: Not on file   Physical Activity: Not on file   Stress: Not on file   Social Connections: Not on file   Intimate Partner Violence: Not on file   Housing Stability: Not on file       Allergies   Allergen Reactions    Sulfa Antibiotics Swelling     Current Outpatient Medications on File Prior to Visit   Medication Sig Dispense Refill    oxyCODONE (ROXICODONE) 5 MG immediate release tablet TAKE 1 TABLET BY MOUTH EVERY 6 HOURS FOR UP TO 5 DAYS AS NEEDED FOR SEVERE PAIN      traMADol (ULTRAM) 50 MG tablet Take 1 tablet by mouth every 6 hours as needed for Pain for up to 7 days. Intended supply: 7 days. Take lowest dose possible to manage pain Max Daily Amount: 200 mg 28 tablet 0    ibuprofen (IBU) 800 MG tablet Take 1 tablet by mouth every 8 hours as needed for Pain 20 tablet 0    acetaminophen (TYLENOL) 500 MG tablet Take 1 tablet by mouth every 6-8 hours as needed for Pain 15 tablet 5    albuterol sulfate HFA (VENTOLIN HFA) 108 (90 Base) MCG/ACT inhaler Inhale 2 puffs into the lungs 4 times daily as needed for Wheezing 18 g 1     No current facility-administered medications on file prior to visit. Review of Systems  A 14 point review of systems was completed by the patient on 1/12/2023 and is available in the media section of the scanned medical record and was reviewed on 1/12/2023. The review is negative with the exception of those things mentioned in the HPI and Past Medical History    Vital Signs: There were no vitals filed for this visit. General Exam:   Constitutional: Patient is adequately groomed with no evidence of malnutrition  Mental Status: The patient is oriented to time, place and person. The patient's mood and affect are appropriate.       Right shoulder Examination:    Inspection:  No skin lesions, no obvious deformity, mild swelling    Palpation:  Tenderness over anterior glenohumeral joint, Non-tender to palpate AC joint    Active Range of Motion: Deferred    Passive Range of Motion: Limited secondary to pain no crepitus to gentle pendulum range of motion    Strength: Deferred    Special Tests: Deferred    Neurovascular: Palpable radial pulse, normal sensation in the median, ulnar, radial  nerve distributions. He does have slightly decreased sensation over the axillary nerve region 1/2 but does have evidence of motor firing of the deltoid, though full exam of this is limited secondary to pain. Left comparison shoulder exam  Additional Examinations:    Examination of the contralateral extremity does not show any tenderness, deformity or injury. Range of motion is full. There is no gross instability. There are no rashes, ulcerations or lesions. Strength and tone are normal.      Self assessment questionnaires were completed today. Radiology:     Plain radiographs of the right shoulder, comprising 3 views: AP, Scapular Y and Axillary lateral were  obtained and reviewed in the office:    Impression:   Mildly displaced Bankart type fracture of anterior inferior aspect of glenoid labrum with minimal comminution. Otherwise no traumatic abnormality identified. Assessment :  Vivian Meadows is a pleasant 41 y.o. male with pain related to right shoulder dislocation and resultant bony Bankart lesion. The fragment is relatively large and amounts to a 20% bony defect. Early surgical repair is essential.        Impression:  Right shoulder bone Bankart lesion. Office Procedures:  Orders Placed This Encounter   Procedures    DJO ultrasling IV Shoulder Sling     Patient was prescribed a DJO Ultrasling IV Shoulder Brace. The right shoulder will require stabilization / immobilization from this orthosis. The orthosis will assist in protecting the affected area, provide functional support and facilitate healing. The device was ordered and fit on 1/12/23. The patient was educated and fit by a healthcare professional with expert knowledge and specialization in brace application while under the direct supervision of the treating physician.   Verbal and written instructions for the use of and application of this item were provided. They were instructed to contact the office immediately should the brace result in increased pain, decreased sensation, increased swelling or worsening of the condition. Treatment Plan:  Pertinent imaging and limited exam findings were reviewed with Christiano. The etiology, natural history, and treatment options for the disorder were discussed. The roles of activity modifications, medications, cryotherapy and heat, injections, physical therapy, and surgical interventions were all described to the patient and questions were answered. We had a good discussion today with [place] in clinic. Imaging does demonstrate a bony Bankart lesion is not clinically suggests concern for ongoing instability. Today I have provided him with a prescription for short course of a few Vicodin (Norco) and have asked him to discontinue tramadol he was prescribed yesterday as it is not been effective for him. We will plan to proceed with surgery early next week and as such advised him to proceed with preoperative work-up including H&P. Risks, benefits and potential complications of arthroscopic shoulder surgery were discussed with the patient. Risks discussed include but are not limited to bleeding, infection, anesthetic risk, injury to nerves and blood vessels, deep vein thrombosis, residual stiffness and weakness, and the need for revision surgery. The patient also understands that anesthetic risks include cardiopulmonary issues, drug reactions and even death. The patient voices an understanding of the importance of physical therapy and home exercises after surgery. All questions were answered and written informed consent for surgery was obtained today. Christiano will follow up for postoperative management. He was  in agreement with this plan and all questions were answered to the patient's satisfaction. He was encouraged to call with any questions.          9:19 AM  1/12/2023    Jose C Martinez MD [facility]    Orthopaedic Surgeon, Clinical Fellow  Laura Ac     The encounter with the patient was supervised by Dr. Albert Ramirez who personally examined the patient and reviewed the plan. This dictation was performed with a verbal recognition program (DRAGON) and it was checked for errors. It is possible that there are still dictated errors within this office note. If so, please bring any errors to my attention for an addendum. All efforts were made to ensure that this office note is accurate.    _________________________________  I was physically present and personally supervised the Orthopaedic Sports Medicine Fellow in the evaluation and development of a treatment plan for this patient. I personally interviewed the patient and performed a physical examination. In addition, I discussed the patient's condition and treatment options with them. I have also reviewed and agree with the past medical, family and social history unless otherwise noted. All of the patient's questions were answered. Anuel Ramirez MD, PhD  1/12/2023
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
## 2023-01-12 NOTE — LETTER
[address] [place] [address],[address] [address]  Phone: [phone]  Fax: [phone]    Peter Bledsoe MD        January 12, 2023     Patient: Mathew Dasilva   YOB: 1985   Date of Visit: 1/12/2023       To Whom It May Concern: It is my medical opinion that Rodrigo Orellana was seen in the office today and may retun to work on 01/13/23. If you have any questions or concerns, please don't hesitate to call.     Sincerely,          Peter Bledsoe MD

## 2023-01-12 NOTE — TELEPHONE ENCOUNTER
General Question     Subject: PATIENT STATES HIS PHARMACY HAS NOT RECEIVED HIS PRESCRIPTION. PLEASE ADVISE.    Patient [address], [address] [facility] Number: [phone]

## 2023-01-12 NOTE — PROGRESS NOTES
H&P patient going today 1/12/23 to a clinic in [facility] and he is unsure of name.   Instructed to bring paper copy dos along with fax # given to him to also fax to us.-db    requested orders from office of Dr. Linden Macias via chat/teams Jerrilyn Lefort and Bola Walker -db     Orders received and placed. -db    1/13 8485 LMOR for  patient- BE SURE TO BRING H&P [address] ANYTHING VIA Genie Pink /NORBERTO

## 2023-01-12 NOTE — PROGRESS NOTES
[facility] PRE-SURGICAL TESTING INSTRUCTIONS                      PRIOR TO PROCEDURE DATE:    1. PLEASE FOLLOW ANY INSTRUCTIONS GIVEN TO YOU PER YOUR SURGEON. 2. Arrange for someone to drive you home and be with you for the first 24 hours after discharge for your safety after your procedure for which you received sedation. Ensure it is someone we can share information with regarding your discharge. NOTE: At this time ONLY 2 ADULTS may accompany you   One person ENCOURAGED to stay at hospital entire time if outpatient surgery      3. You must contact your surgeon for instructions IF:  You are taking any blood thinners, aspirin, anti-inflammatory or vitamins. There is a change in your physical condition such as a cold, fever, rash, cuts, sores, or any other infection, especially near your surgical site. 4. Do not drink alcohol the day before or day of your procedure. Do not use any recreational marijuana at least 24 hours or street drugs (heroin, cocaine) at minimum 5 days prior to your procedure. 5. A Pre-Surgical History and Physical MUST be completed WITHIN 30 DAYS OR LESS prior to your procedure. by your Physician or an Urgent Care        THE DAY OF YOUR PROCEDURE:  1. Follow instructions for ARRIVAL TIME as DIRECTED BY YOUR SURGEON. 2. Enter the MAIN entrance from [employer] and follow the signs to the free Parking [employer] or [employer] (offered free of charge 7 am-5pm). 3. Enter the Main Entrance of the hospital (do not enter from the lower level of the parking garage). Upon entrance, check in with the  at the surgical information desk on your LEFT. Bring your insurance card and photo ID to register      4. DO NOT EAT ANYTHING 8 hours prior to arrival for surgery. You may have up to 8 ounces of water 4 hours prior to your arrival for surgery.    NOTE: ALL Gastric, Bariatric & Bowel surgery patients - you MUST follow your surgeon's instructions regarding eating/ drinking as you will have very specific instructions to follow. If you did not receive these, call your surgeon's office immediately. 5. MEDICATIONS:  Take the following medications with a SMALL sip of water: hydrocodone if needed and inform anesthesia day of surgery bring albuterol inhaler  Use your usual dose of inhalers the morning of surgery. BRING your rescue inhaler with you to hospital.   Anesthesia does NOT want you to take insulin the morning of surgery. They will control your blood sugar while you are at the hospital. Please contact your ordering physician for instructions regarding your insulin the night before your procedure. If you have an insulin pump, please keep it set on basal rate. Bariatric patient's call your surgeon if on diabetic medications as some may need to be stopped 1 week prior to surgery    6. Do not swallow additional water when brushing teeth. No gum, candy, mints, or ice chips. Refrain from smoking or at least decrease the amount on day of surgery. 7. Morning of surgery:   Take a shower with an antibacterial soap (i.e., Safeguard or Dial) OR your physician may have instructed you to use Hibiclens. Dress in loose, comfortable clothing appropriate for redressing after your procedure. Do not wear jewelry (including body piercings), make-up (especially NO eye make-up), fingernail polish (NO toenail polish if foot/leg surgery), lotion, powders, or metal hairclips. Do not shave or wax for 72 hours prior to procedure near your operative site. Shaving with a razor can irritate your skin and make it easier to develop an infection. On the day of your procedure, any hair that needs to be removed near the surgical site will be 'clipped' by a healthcare worker using a special clipper designed to avoid skin irritation. 8. Dentures, glasses, or contacts will need to be removed before your procedure.  Bring cases for your glasses, contacts, dentures, or hearing aids to protect them while you are in surgery. 9. If you use a CPAP, please bring it with you on the day of your procedure. 10. We recommend that valuable personal belongings such as cash, cell phones, e-tablets, or jewelry, be left at home during your stay. The hospital will not be responsible for valuables that are not secured in the hospital safe. However, if your insurance requires a co-pay, you may want to bring a method of payment, i.e., Check or credit card, if you wish to pay your co-pay the day of surgery. 11. If you are to stay overnight, you may bring a bag with personal items. Please have any large items you may need brought in by your family after your arrival to your hospital room. 12. If you have a Living Will or Durable Power of , please bring a copy on the day of your procedure. How we keep you safe and work to prevent surgical site infections:   1. Health care workers should always check your ID bracelet to verify your name and birth date. You will be asked many times to state your name, date of birth, and allergies. 2. Health care workers should always clean their hands with soap or alcohol gel before providing care to you. It is okay to ask anyone if they cleaned their hands before they touch you. 3. You will be actively involved in verifying the type of procedure you are having and ensuring the correct surgical site. This will be confirmed multiple times prior to your procedure. Do NOT ba your surgery site UNLESS instructed to by your surgeon. 4. When you are in the operating room, your surgical site will be cleansed with a special soap, and in most cases, you will be given an antibiotic before the surgery begins. What to expect AFTER your procedure? 1. Immediately following your procedure, your will be taken to the PACU for the first phase of your recovery.   Your nurse will help you recover from any potential side effects of anesthesia, such as extreme drowsiness, changes in your vital signs or breathing patterns. Nausea, headache, muscle aches, or sore throat may also occur after anesthesia. Your nurse will help you manage these potential side effects. 2. For comfort and safety, arrange to have someone at home with you for the first 24 hours after discharge. 3. You and your family will be given written instructions about your diet, activity, dressing care, medications, and return visits. 4. Once at home, should issues with nausea, pain, or bleeding occur, or should you notice any signs of infection, you should call your surgeon. 5. Always clean your hands before and after caring for your wound. Do not let your family touch your surgery site without cleaning their hands. 6. Narcotic pain medications can cause significant constipation. You may want to add a stool softener to your postoperative medication schedule or speak to your surgeon on how best to manage this SIDE EFFECT. SPECIAL INSTRUCTIONS bring in sling and albuterol inhaler; patient acknowledges understanding of this along with pre op instructions. Thank you for allowing us to care for you. We strive to exceed your expectations in the delivery of care and service provided to you and your family. If you need to contact the [place] [address] staff for any reason, please call us at [phone]    Instructions reviewed with patient during preadmission testing phone interview.   Mikaela Mcclure RN.1/12/2023 .11:43 AM      ADDITIONAL EDUCATIONAL INFORMATION REVIEWED PER PHONE WITH YOU AND/OR YOUR FAMILY:    Yes Antibacterial Soap

## 2023-01-12 NOTE — LETTER
[place] [address] [address]  Phone: [phone]  Fax: [phone]    Geraldine Silva MD        January 12, 2023     Patient: Bere Swain   YOB: 1985   Date of Visit: 1/12/2023       To Whom It May Concern: It is my medical opinion that [address] [place] [address] {Work release (duty restriction):60228}. If you have any questions or concerns, please don't hesitate to call.     Sincerely,        Geraldine Silva MD

## 2023-01-12 NOTE — LETTER
[facility]  Surgery Precert & Billing Form:    DEMOGRAPHICS:                                                                                                       Patient Name:  Santy Louis  Patient :  1985   Patient SS#:      Patient Phone:  [phone] (home)  Alt. Patient Phone:    Patient Address:  [address]  [facility] [address]    PCP:  No primary care provider on file.  Insurance:  [facility]     DIAGNOSIS & PROCEDURE:                                                                                      Diagnosis:    Closed fracture of shoulder, right, initial encounter  - Primary S42.91XA       Operation: right    SURGERY  INFORMATION  Date of Surgery:   23  Location:     [facility]   Type:    Outpatient  23 hour hold:  No  Surgeon:        Poornima Almendarez MD         23     BILLING INFORMATION:                                                                                                Physician Procedure                                            CPT Codes                      PA, or Fellow Procedure                                      CPT Codes                      Precert information:

## 2023-01-12 NOTE — PROGRESS NOTES
Place patient label inside box (if no patient label, complete below)  Name:  :    MR#:   Denver Caller / PROCEDURE  I (we), Rosi Stager (Patient Name) authorize Annette Esquivel MD (Provider / Kristin Godinez) and/or such assistants as may be selected by him/her, to perform the following operation/procedure(s): RIGHT SHOULDER ARTHROSCOPY, REPAIR BONY BANKART AND LABRAL REPAIR       Note: If unable to obtain consent prior to an emergent procedure, document the emergent reason in the medical record. This procedure has been explained to my (our) satisfaction and included in the explanation was: The intended benefit, nature, and extent of the procedure to be performed; The significant risks involved and the probability of success; Alternative procedures and methods of treatment; The dangers and probable consequences of such alternatives (including no procedure or treatment); The expected consequences of the procedure on my future health; Whether other qualified individuals would be performing important surgical tasks and/or whether  would be present to advise or support the procedure. I (we) understand that there are other risks of infection and other serious complications in the pre-operative/procedural and postoperative/procedural stages of my (our) care. I (we) have asked all of the questions which I (we) thought were important in deciding whether or not to undergo treatment or diagnosis. These questions have been answered to my (our) satisfaction. I (we) understand that no assurance can be given that the procedure will be a success, and no guarantee or warranty of success has been given to me (us). It has been explained to me (us) that during the course of the operation/procedure, unforeseen conditions may be revealed that necessitate extension of the original procedure(s) or different procedure(s) than those set forth in Paragraph 1.  I (we) authorize and request that the above-named physician, his/her assistants or his/her designees, perform procedures as necessary and desirable if deemed to be in my (our) best interest.     Revised 8/2/2021                                                                          Page 1 of 2       I acknowledge that health care personnel may be observing this procedure for the purpose of medical education or other specified purposes as may be necessary as requested and/or approved by my (our) physician. I (we) consent to the disposal by the hospital Pathologist of the removed tissue, parts or organs in accordance with hospital policy. I do ____ do not ____ consent to the use of a local infiltration pain blocking agent that will be used by my provider/surgical provider to help alleviate pain during my procedure. I do ____ do not ____ consent to an emergent blood transfusion in the case of a life-threatening situation that requires blood components to be administered. This consent is valid for 24 hours from the beginning of the procedure. This patient does ____ or does not ____ currently have a DNR status/order. If DNR order is in place, obtain Addendum to the Surgical Consent for ALL Patients with a DNR Order to address gricelda-operative status for limited intervention or DNR suspension.      I have read and fully understand the above Consent for Operation/Procedure and that all blanks were completed before I signed the consent.   _____________________________       _____________________      ____/____am/pm  Signature of Patient or legal representative      Printed Name / Relationship            Date / Time   ____________________________       _____________________      ____/____am/pm  Witness to Signature                                    Printed Name                    Date / Time    If patient is unable to sign or is a minor, complete the following)  Patient is a minor, ____ years of age, or unable to sign because:   ______________________________________________________________________________________________    If a phone consent is obtained, consent will be documented by using two health care professionals, each affirming that the consenting party has no questions and gives consent for the procedure discussed with the physician/provider.   _____________________          ____________________       _____/_____am/pm   2nd witness to phone consent        Printed name           Date / Time    Informed Consent:  I have provided the explanation described above in section 1 to the patient and/or legal representative.  I have provided the patient and/or legal representative with an opportunity to ask any questions about the proposed operation/procedure.   ___________________________          ____________________         ____/____am/pm  Provider / Proceduralist                            Printed name            Date / Time  Revised 8/2/2021                                                                      Page 2 of 2

## 2023-01-12 NOTE — LETTER
Daily Note     Today's date: 10/23/2018  Patient name: Eddy Mooney  : 1963  MRN: [id number]  Referring provider: Simi Mccracken MD  Dx:   Encounter Diagnosis     ICD-10-CM    1  Acute pain of left shoulder M25 512                   Subjective: Patient reports he is feeling "cured " Pt states he no longer experiences pain with tucking in his shirt, reaching into overhead cabinets or lifting items  Pt also states he no longer experiences sleep disturbances due to pain  Objective: See treatment diary below      Assessment: Progressed exercises this session to include increase resistance on theraband strengthening exercises  Pt required minimal verbal cues to ensure proper hand position on incline bench exercises  Pt was able to tolerate all exercises with minimal complaints of pain/discomfort  Provided pt with updated HEP and black TB and ensured independence with exercises  Educated pt to cx remaining appts this week and next week and we will follow-up in 2 weeks to determine if PT services are still needed  Plan: Progress note during next visit       Precautions: a-fib, asthma, HTN, on blood thinners (no graston)     Daily Treatment Diary      Manual  10-9 (IE)  10-16  10/18  10-23               STM to left infraspinatus    JG  5'                 PROM into shoulder IR at 90 deg abd    JG  5'                                                                                               Exercise Diary  10-9  10-16  10/18  10-23               UBE post 4 mins NV  4 mins  4 min  4 min                                       Corner stretch 4 x 30" NV  4 x 30" 4x30"  4 x 30"                                       TB:                       - rows 2 x 10 GTB NV  2 x 10 GTB GTB 2x10  Black TB 3 x 10               - pulldowns 2 x 10 GTB NV  2 x 10 GTB GTB 2x10  Black TB 3 x 10               - ER 2 x 10 GTB NV  2 x 10 GTB GTB 2x10  Black TB 3 x 10                                       Incline bench:           [place] [address] [place] [address],[address] [address]  Phone: [phone]  Fax: [phone]    Tran Samano MD        January 12, 2023     Patient: Yelitza Riggins   YOB: 1985   Date of Visit: 1/12/2023       To Whom It May Concern: It is my medical opinion that Satinder Gamez was seen in the office today . If you have any questions or concerns, please don't hesitate to call.     Sincerely,          Tran Samano MD             - I's 10 x ea NV  10 x ea  2x10 ea                 - T's 10 x ea NV  10 x ea 2x10 ea                 - Y's 10 x ea NV  10 x ea 2x10 ea                                                                                                                                                                                               Modalities  10-9 (IE)  10/16  10/18                 Cryo as needed      defers                                                                       Access Code: [email or web address]   URL: [email or web address]  [email or web address]   Date: 10/23/2018   Prepared by: Evin Malik      Exercises  · Corner Pec Major Stretch - 4 reps - 30 seconds hold - 3x daily - 7x weekly  · Seated Cervical Retraction - 10 reps - 10 seconds hold - 3x daily - 7x weekly  · Cross Body Shoulder Stretch at Wall - 4 reps - 30 seconds hold - 3x daily - 7x weekly  · Standing Shoulder Row with Anchored Resistance - 10 reps - 3 sets - 4x weekly  · Shoulder Extension with Resistance - Neutral - 10 reps - 3 sets - 4x weekly  · Shoulder External Rotation with Anchored Resistance - 10 reps - 3 sets - 4x weekly

## 2023-01-12 NOTE — LETTER
[place] [address] [place] [address],[address] [address]  Phone: [phone]  Fax: [phone]    Alyx Fletcher MD        January 12, 2023     Patient: Jennifer Kruger   YOB: 1985   Date of Visit: 1/12/2023       To Whom It May Concern: It is my medical opinion that Harvey Whitten is scheduled for surgery on 01/16/23 and he will need to be off work 6 weeks post surgery. If you have any questions or concerns, please don't hesitate to call.     Sincerely,          Alyx Fletcher MD

## 2023-01-13 NOTE — TELEPHONE ENCOUNTER
Karlie Cabello [id number]    Date: 01/16/23 thru 04/16/23  Type of SX:  Outpatient  Location: [facility]  CPT: 56502   DX Code: S42. 91XA  SX area:  shoulder  Insurance: [facility]

## 2023-01-16 ENCOUNTER — ANESTHESIA (OUTPATIENT)
Dept: OPERATING ROOM | Age: 38
End: 2023-01-16
Payer: MEDICAID

## 2023-01-16 ENCOUNTER — ANESTHESIA EVENT (OUTPATIENT)
Dept: OPERATING ROOM | Age: 38
End: 2023-01-16
Payer: MEDICAID

## 2023-01-16 ENCOUNTER — HOSPITAL ENCOUNTER (OUTPATIENT)
Age: 38
Setting detail: OUTPATIENT SURGERY
Discharge: HOME | End: 2023-01-16
Attending: ORTHOPAEDIC SURGERY | Admitting: ORTHOPAEDIC SURGERY
Payer: MEDICAID

## 2023-01-16 VITALS
RESPIRATION RATE: 24 BRPM | DIASTOLIC BLOOD PRESSURE: 99 MMHG | HEIGHT: 71 IN | TEMPERATURE: 97.8 F | BODY MASS INDEX: 38.02 KG/M2 | OXYGEN SATURATION: 92 % | WEIGHT: 271.6 LBS | HEART RATE: 83 BPM | SYSTOLIC BLOOD PRESSURE: 167 MMHG

## 2023-01-16 DIAGNOSIS — S43.004S SHOULDER DISLOCATION, RIGHT, SEQUELA: Primary | ICD-10-CM

## 2023-01-16 PROCEDURE — 6360000002 HC RX W HCPCS: Performed by: ORTHOPAEDIC SURGERY

## 2023-01-16 PROCEDURE — 3600000014 HC SURGERY LEVEL 4 ADDTL 15MIN: Performed by: ORTHOPAEDIC SURGERY

## 2023-01-16 PROCEDURE — 64415 NJX AA&/STRD BRCH PLXS IMG: CPT | Performed by: ANESTHESIOLOGY

## 2023-01-16 PROCEDURE — 7100000010 HC PHASE II RECOVERY - FIRST 15 MIN: Performed by: ORTHOPAEDIC SURGERY

## 2023-01-16 PROCEDURE — C1713 ANCHOR/SCREW BN/BN,TIS/BN: HCPCS | Performed by: ORTHOPAEDIC SURGERY

## 2023-01-16 PROCEDURE — 3700000000 HC ANESTHESIA ATTENDED CARE: Performed by: ORTHOPAEDIC SURGERY

## 2023-01-16 PROCEDURE — 2580000003 HC RX 258: Performed by: ORTHOPAEDIC SURGERY

## 2023-01-16 PROCEDURE — 7100000001 HC PACU RECOVERY - ADDTL 15 MIN: Performed by: ORTHOPAEDIC SURGERY

## 2023-01-16 PROCEDURE — 6370000000 HC RX 637 (ALT 250 FOR IP): Performed by: ANESTHESIOLOGY

## 2023-01-16 PROCEDURE — 6360000002 HC RX W HCPCS: Performed by: ANESTHESIOLOGY

## 2023-01-16 PROCEDURE — 3700000001 HC ADD 15 MINUTES (ANESTHESIA): Performed by: ORTHOPAEDIC SURGERY

## 2023-01-16 PROCEDURE — 2580000003 HC RX 258: Performed by: ANESTHESIOLOGY

## 2023-01-16 PROCEDURE — 6360000002 HC RX W HCPCS: Performed by: NURSE ANESTHETIST, CERTIFIED REGISTERED

## 2023-01-16 PROCEDURE — 7100000011 HC PHASE II RECOVERY - ADDTL 15 MIN: Performed by: ORTHOPAEDIC SURGERY

## 2023-01-16 PROCEDURE — 2500000003 HC RX 250 WO HCPCS: Performed by: NURSE ANESTHETIST, CERTIFIED REGISTERED

## 2023-01-16 PROCEDURE — 7100000000 HC PACU RECOVERY - FIRST 15 MIN: Performed by: ORTHOPAEDIC SURGERY

## 2023-01-16 PROCEDURE — 2720000010 HC SURG SUPPLY STERILE: Performed by: ORTHOPAEDIC SURGERY

## 2023-01-16 PROCEDURE — 3600000004 HC SURGERY LEVEL 4 BASE: Performed by: ORTHOPAEDIC SURGERY

## 2023-01-16 PROCEDURE — 2709999900 HC NON-CHARGEABLE SUPPLY: Performed by: ORTHOPAEDIC SURGERY

## 2023-01-16 DEVICE — IMPLANTABLE DEVICE: Type: IMPLANTABLE DEVICE | Site: SHOULDER | Status: FUNCTIONAL

## 2023-01-16 DEVICE — ANCHOR SUT L14.5MM DIA3MM BIOCOMPOSITE W/ TWO SZ 2: Type: IMPLANTABLE DEVICE | Site: SHOULDER | Status: FUNCTIONAL

## 2023-01-16 RX ORDER — SODIUM CHLORIDE, SODIUM LACTATE, POTASSIUM CHLORIDE, CALCIUM CHLORIDE 600; 310; 30; 20 MG/100ML; MG/100ML; MG/100ML; MG/100ML
INJECTION, SOLUTION INTRAVENOUS CONTINUOUS
Status: DISCONTINUED | OUTPATIENT
Start: 2023-01-16 | End: 2023-01-16 | Stop reason: HOSPADM

## 2023-01-16 RX ORDER — LIDOCAINE HYDROCHLORIDE 20 MG/ML
INJECTION, SOLUTION EPIDURAL; INFILTRATION; INTRACAUDAL; PERINEURAL PRN
Status: DISCONTINUED | OUTPATIENT
Start: 2023-01-16 | End: 2023-01-16 | Stop reason: SDUPTHER

## 2023-01-16 RX ORDER — ROCURONIUM BROMIDE 10 MG/ML
INJECTION, SOLUTION INTRAVENOUS PRN
Status: DISCONTINUED | OUTPATIENT
Start: 2023-01-16 | End: 2023-01-16 | Stop reason: SDUPTHER

## 2023-01-16 RX ORDER — MIDAZOLAM HYDROCHLORIDE 1 MG/ML
INJECTION INTRAMUSCULAR; INTRAVENOUS PRN
Status: DISCONTINUED | OUTPATIENT
Start: 2023-01-16 | End: 2023-01-16 | Stop reason: SDUPTHER

## 2023-01-16 RX ORDER — OXYCODONE HYDROCHLORIDE 5 MG/1
5 TABLET ORAL ONCE
Status: COMPLETED | OUTPATIENT
Start: 2023-01-16 | End: 2023-01-16

## 2023-01-16 RX ORDER — LIDOCAINE HYDROCHLORIDE 10 MG/ML
1 INJECTION, SOLUTION EPIDURAL; INFILTRATION; INTRACAUDAL; PERINEURAL
Status: DISCONTINUED | OUTPATIENT
Start: 2023-01-16 | End: 2023-01-16 | Stop reason: HOSPADM

## 2023-01-16 RX ORDER — SODIUM CHLORIDE 0.9 % (FLUSH) 0.9 %
5-40 SYRINGE (ML) INJECTION PRN
Status: DISCONTINUED | OUTPATIENT
Start: 2023-01-16 | End: 2023-01-16 | Stop reason: HOSPADM

## 2023-01-16 RX ORDER — GLYCOPYRROLATE 0.2 MG/ML
INJECTION INTRAMUSCULAR; INTRAVENOUS PRN
Status: DISCONTINUED | OUTPATIENT
Start: 2023-01-16 | End: 2023-01-16 | Stop reason: SDUPTHER

## 2023-01-16 RX ORDER — HYDRALAZINE HYDROCHLORIDE 20 MG/ML
10 INJECTION INTRAMUSCULAR; INTRAVENOUS
Status: COMPLETED | OUTPATIENT
Start: 2023-01-16 | End: 2023-01-16

## 2023-01-16 RX ORDER — OXYCODONE HYDROCHLORIDE AND ACETAMINOPHEN 5; 325 MG/1; MG/1
1 TABLET ORAL EVERY 6 HOURS PRN
Qty: 28 TABLET | Refills: 0 | Status: SHIPPED | OUTPATIENT
Start: 2023-01-16 | End: 2023-01-23

## 2023-01-16 RX ORDER — FENTANYL CITRATE 50 UG/ML
INJECTION, SOLUTION INTRAMUSCULAR; INTRAVENOUS PRN
Status: DISCONTINUED | OUTPATIENT
Start: 2023-01-16 | End: 2023-01-16 | Stop reason: SDUPTHER

## 2023-01-16 RX ORDER — PROCHLORPERAZINE EDISYLATE 5 MG/ML
5 INJECTION INTRAMUSCULAR; INTRAVENOUS
Status: COMPLETED | OUTPATIENT
Start: 2023-01-16 | End: 2023-01-16

## 2023-01-16 RX ORDER — MEPERIDINE HYDROCHLORIDE 25 MG/ML
12.5 INJECTION INTRAMUSCULAR; INTRAVENOUS; SUBCUTANEOUS EVERY 5 MIN PRN
Status: DISCONTINUED | OUTPATIENT
Start: 2023-01-16 | End: 2023-01-16 | Stop reason: HOSPADM

## 2023-01-16 RX ORDER — SODIUM CHLORIDE 0.9 % (FLUSH) 0.9 %
5-40 SYRINGE (ML) INJECTION EVERY 12 HOURS SCHEDULED
Status: DISCONTINUED | OUTPATIENT
Start: 2023-01-16 | End: 2023-01-16 | Stop reason: HOSPADM

## 2023-01-16 RX ORDER — ONDANSETRON 4 MG/1
4 TABLET, FILM COATED ORAL EVERY 8 HOURS PRN
Qty: 15 TABLET | Refills: 0 | Status: SHIPPED | OUTPATIENT
Start: 2023-01-16

## 2023-01-16 RX ORDER — DEXAMETHASONE SODIUM PHOSPHATE 4 MG/ML
INJECTION, SOLUTION INTRA-ARTICULAR; INTRALESIONAL; INTRAMUSCULAR; INTRAVENOUS; SOFT TISSUE PRN
Status: DISCONTINUED | OUTPATIENT
Start: 2023-01-16 | End: 2023-01-16 | Stop reason: SDUPTHER

## 2023-01-16 RX ORDER — FENTANYL CITRATE 50 UG/ML
INJECTION, SOLUTION INTRAMUSCULAR; INTRAVENOUS
Status: DISCONTINUED
Start: 2023-01-16 | End: 2023-01-16 | Stop reason: HOSPADM

## 2023-01-16 RX ORDER — PROPOFOL 10 MG/ML
INJECTION, EMULSION INTRAVENOUS PRN
Status: DISCONTINUED | OUTPATIENT
Start: 2023-01-16 | End: 2023-01-16 | Stop reason: SDUPTHER

## 2023-01-16 RX ORDER — SENNA PLUS 8.6 MG/1
1 TABLET ORAL 2 TIMES DAILY PRN
Qty: 20 TABLET | Refills: 0 | Status: SHIPPED | OUTPATIENT
Start: 2023-01-16 | End: 2023-01-30

## 2023-01-16 RX ORDER — ONDANSETRON 2 MG/ML
INJECTION INTRAMUSCULAR; INTRAVENOUS PRN
Status: DISCONTINUED | OUTPATIENT
Start: 2023-01-16 | End: 2023-01-16 | Stop reason: SDUPTHER

## 2023-01-16 RX ORDER — MIDAZOLAM HYDROCHLORIDE 1 MG/ML
INJECTION INTRAMUSCULAR; INTRAVENOUS
Status: DISCONTINUED
Start: 2023-01-16 | End: 2023-01-16 | Stop reason: HOSPADM

## 2023-01-16 RX ORDER — SODIUM CHLORIDE 9 MG/ML
25 INJECTION, SOLUTION INTRAVENOUS PRN
Status: DISCONTINUED | OUTPATIENT
Start: 2023-01-16 | End: 2023-01-16 | Stop reason: HOSPADM

## 2023-01-16 RX ADMIN — CEFAZOLIN 3000 MG: 10 INJECTION, POWDER, FOR SOLUTION INTRAVENOUS at 15:26

## 2023-01-16 RX ADMIN — GLYCOPYRROLATE 0.2 MG: 0.2 INJECTION INTRAMUSCULAR; INTRAVENOUS at 15:19

## 2023-01-16 RX ADMIN — SUGAMMADEX 200 MG: 100 INJECTION, SOLUTION INTRAVENOUS at 17:51

## 2023-01-16 RX ADMIN — FENTANYL CITRATE 50 MCG: 50 INJECTION, SOLUTION INTRAMUSCULAR; INTRAVENOUS at 15:57

## 2023-01-16 RX ADMIN — HYDROMORPHONE HYDROCHLORIDE 0.5 MG: 1 INJECTION, SOLUTION INTRAMUSCULAR; INTRAVENOUS; SUBCUTANEOUS at 19:03

## 2023-01-16 RX ADMIN — DEXAMETHASONE SODIUM PHOSPHATE 4 MG: 4 INJECTION, SOLUTION INTRAMUSCULAR; INTRAVENOUS at 15:27

## 2023-01-16 RX ADMIN — ONDANSETRON 4 MG: 2 INJECTION INTRAMUSCULAR; INTRAVENOUS at 15:27

## 2023-01-16 RX ADMIN — MEPERIDINE HYDROCHLORIDE 12.5 MG: 25 INJECTION INTRAMUSCULAR; INTRAVENOUS; SUBCUTANEOUS at 18:53

## 2023-01-16 RX ADMIN — MEPERIDINE HYDROCHLORIDE 12.5 MG: 25 INJECTION INTRAMUSCULAR; INTRAVENOUS; SUBCUTANEOUS at 19:30

## 2023-01-16 RX ADMIN — OXYCODONE HYDROCHLORIDE 5 MG: 5 TABLET ORAL at 20:54

## 2023-01-16 RX ADMIN — HYDRALAZINE HYDROCHLORIDE 10 MG: 20 INJECTION INTRAMUSCULAR; INTRAVENOUS at 19:34

## 2023-01-16 RX ADMIN — FENTANYL CITRATE 50 MCG: 50 INJECTION, SOLUTION INTRAMUSCULAR; INTRAVENOUS at 15:19

## 2023-01-16 RX ADMIN — SODIUM CHLORIDE, POTASSIUM CHLORIDE, SODIUM LACTATE AND CALCIUM CHLORIDE: 600; 310; 30; 20 INJECTION, SOLUTION INTRAVENOUS at 14:06

## 2023-01-16 RX ADMIN — PROCHLORPERAZINE EDISYLATE 5 MG: 5 INJECTION, SOLUTION INTRAMUSCULAR; INTRAVENOUS at 20:23

## 2023-01-16 RX ADMIN — LIDOCAINE HYDROCHLORIDE 50 MG: 20 INJECTION, SOLUTION EPIDURAL; INFILTRATION; INTRACAUDAL; PERINEURAL at 15:19

## 2023-01-16 RX ADMIN — ROCURONIUM BROMIDE 80 MG: 10 INJECTION INTRAVENOUS at 15:20

## 2023-01-16 RX ADMIN — METHOCARBAMOL 1000 MG: 100 INJECTION, SOLUTION INTRAMUSCULAR; INTRAVENOUS at 20:00

## 2023-01-16 RX ADMIN — PROPOFOL 200 MG: 10 INJECTION, EMULSION INTRAVENOUS at 15:19

## 2023-01-16 RX ADMIN — HYDRALAZINE HYDROCHLORIDE 10 MG: 20 INJECTION INTRAMUSCULAR; INTRAVENOUS at 19:10

## 2023-01-16 RX ADMIN — MIDAZOLAM HYDROCHLORIDE 2 MG: 2 INJECTION, SOLUTION INTRAMUSCULAR; INTRAVENOUS at 15:12

## 2023-01-16 RX ADMIN — HYDROMORPHONE HYDROCHLORIDE 0.5 MG: 1 INJECTION, SOLUTION INTRAMUSCULAR; INTRAVENOUS; SUBCUTANEOUS at 18:49

## 2023-01-16 ASSESSMENT — PAIN SCALES - GENERAL
PAINLEVEL_OUTOF10: 8
PAINLEVEL_OUTOF10: 0
PAINLEVEL_OUTOF10: 4
PAINLEVEL_OUTOF10: 8

## 2023-01-16 ASSESSMENT — PAIN DESCRIPTION - LOCATION
LOCATION: SHOULDER

## 2023-01-16 ASSESSMENT — PAIN DESCRIPTION - ORIENTATION
ORIENTATION: RIGHT
ORIENTATION: RIGHT

## 2023-01-16 ASSESSMENT — PAIN DESCRIPTION - DESCRIPTORS
DESCRIPTORS: STABBING
DESCRIPTORS: ACHING
DESCRIPTORS: ACHING

## 2023-01-16 NOTE — DISCHARGE INSTRUCTIONS
PATIENT INSTRUCTIONS FOLLOWING OUT-PATIENT   SHOULDER SURGERY    1. You have probably had a Scalene Block to your arm. Because this numbs the arm this is great for anesthesia since we didnt have to give you as much anesthetic agent during the case--hopefully allowing you to feel more normal sooner. The block should last around 12 hours or so. So, if you had surgery at [address] [place] [address] it may wear off as soon as 8PM. When the block wears off, it wears off suddenly and you could go from no pain to severe pain quickly. We recommend that you take pain medication (typically 1-2 Percocet) by 8PM even if you are feeling only mild pain. It is easier to maintain good pain relief than to try to catch up.     2. You may have to take the pain medicine every 4-8 hours for the first day or two. After this, you can wean off the medicine and just take it as needed. 3. All pain medications can make you constipated. Drink plenty of fluids and eat lots of fiber to combat this. If you go for more than 3 days without a bowel movement, try a laxative. We recommend Miralax, an over the counter laxative, and/or colace, an over the counter stool softener. 4. Getting comfortable for sleeping is difficult after this surgery. It gets easier after several days. Many patients sleep better in a reclining lounger like a Lay-Z-Boy or in bed with several pillows to help prop them up. 5. Ice packs or a commercial ice cuff/machine are very helpful to reduce pain and inflammation after the surgery. Since the bandage is so thick you can leave the ice bags on top until the shoulder gets cold. Dont ice bare skin however or you could get frostbite. 6. Anesthesia and the pain medication can cause nausea. If this is severe or leads to throwing up call our office at [phone] so we can call an anti-nausea medicine into your pharmacy. Have the pharmacy phone number handy--its on the prescription bottles.     7. You probably have your first post-op appointment already scheduled, along with your first physical therapy appointment. If not, call us at [phone] to schedule it. We like to see you 5-8 days after the operation. 8. Getting dressed. Huge shirts work well to cover the shoulder. We will show you how to put a shirt on at the first office visit so that you can then start wearing the shirt under the sling. 9. Have someone drive you to the first appointment. You will be in a big sling and still probably taking pain medication. That wouldnt look good if you got in an accident. 10. If we gave you arthroscopic snap shots of your repaired shoulder or elbow, please bring them with you to the first office visit so that we can review them together. 11. Keep the dressing dry. The bulky dressing can be removed after 3 days after which it is okay to shower. Until then, however, sponge baths should be done for hygeine. Do not remove the steri-strips. We typically have patients leave them on until they slowly curl up and fall off. The longer the steri-strips are on the prettier the wounds look. Leave the see-through mesh dressing beneath your bandages in place when you eventually remove your outer bandages, we will change it at your first post-operative appointment. 12. Any questions or problems contact us anytime at [phone]. GENERAL OR TWILIGHT ANESTHESIA    1. For a minimum of 24 hours:   No driving or operating heavy machinery   No alcohol, sleeping pills, or tranquilizers   No signing important legal documents  2. Limit your general activity and rest for 24 hours. Resume your normal activities as you begin to feel better. 3.  Eat lightly for your first meal.  Advance to regular diet as tolerated. 4.  Cough and deep breathe every hour while awake for the next 24 hours  5.   If you have any problems and are unable to contact your doctor, go to the nearest emergency department. NERVE BLOCK INSTRUCTIONS      1. Your affected limb will be numb until the block starts to wear off  2. Do not use the affected limb until the block wears off  3. Start taking pain medication before the block wears off--expect pain to increase over the next 12 hours. Please remember while having a nerve block you are at an increased risk for [address] were given a nerve block today from the anesthesiologist. Most nerve blocks last anywhere from 6-36 hours. You should start taking your pain medication before the block wears off or when you first begin feeling discomfort. It takes at least 30-60 minutes for a pain pill to take effect. Pain medications should be taken with food. Consider setting an alarm through the night to help manage your pain level so you do not wake up with too much pain. Pain medicines can cause more sedation and decrease your breathing so ONLY take as directed. If you have Sleep Apnea, you need to use your C-Pap machine. What to expect after a nerve block:    Numbness, tingling- affected area feels heavy or asleep   Weakness or inability to move or control your affected area   Inability to feel temperature changes to your affected area  Usually the weakness wears off first, followed by the tingling or heaviness. You may notice more pain at this point and should start taking your pain meds.    If you had a shoulder block, you may experience:   Mild shortness of breath (may be relieved by sitting up in a chair or recliner)   Hoarse voice   Blurry vision   Unequal pupils   Drooping of your face (eye or lip) on the same side as the nerve block   Swelling at the injection site on the side of your neck  These side effects should resolve as the block wears off   IF you have severe or prolonged shortness of breath- GO to the nearest Emergency Room  If you had a nerve block of your arm or leg:   Protect the arm or leg from extreme hot or cold temperatures   Protect the arm or leg from obstructing blood flow by frequent position changes- Make sure fingers/ toes stay pink and warm. Call surgeon with any changes   For leg block, get assistance walking until the block wears off, i.e.:  crutches, walker, support person    If you continue to feel the effects of the nerve block for longer than 72 hours- call [facility] at [phone] and ask to speak with the Anesthesiologist on call. Your Surgeon or Anesthesiologist gave you Exparel     Exparel (bupivicaine liposome injectable suspension) is a medication injected into the surgical incision  just before the end of the procedure by your surgeon to help control your pain after surgery. It can also be given as a nerve block by the anesthesiologist. This local anesthetic provides pain relief by numbing the tissue around the surgical site. Exparel releases pain medication over time lasting up to 5 days or 120 hours. Exparel may cause a temporary loss of sensation or the ability to move in the area where the medication was injected. Side effects of Exparel that may occur include nausea, vomiting or constipation. Call immediately if you experience numbness or tingling of the mouth or lips, lightheadedness or severe anxiety. Notify your physician if you experience these or any other possible side effects of the medication. Note: Other forms of bupivacaine should not be administered within 96 hours (4 days) following administration of Exparel. Please keep ID band in place for 96 hours (4 days). [facility]    There are potential side effects of anesthesia or sedation you may experience for the first 24 hours. These side effects include:    Confusion or Memory loss, Dizziness, or Delayed Reaction Times   [x]A responsible person should be with you for the next 24 hours.   Do not operate any vehicles (automobiles, bicycles, motorcycles) or power tools or machinery for 24 hours. Do not sign any legal documents or make any legal decisions for 24 hours. Do not drink alcohol for 24 hours or while taking narcotic pain medication. Nausea    [x]Start with light diet and progress to your normal diet as you feel like eating. However, if you experience nausea or repeated episodes of vomiting which persist beyond 12-24 hours, notify your physician. Once nausea has passed, remember to keep drinking fluids. Difficulty Passing Urine  [x]Drink extra amounts of fluid today. Notify your physician if you have not urinated within 8 hours after your procedure or you feel uncomfortable. Irritated Throat from a Breathing Tube  [x]Drink extra amounts of fluid today. Lozenges may help. Muscle Aches  [x]You may experience some generalized body aches as your muscles recover from medications used to relax them during surgery. These will gradually subside. MEDICATION INSTRUCTIONS:    [x]Prescription(S) x    3      Called to Pharmacy Name and location: [place] [address] Dr. Vaibhav Schaffer [facility] [x]Give the list of your medications to your primary care physician on your next visit. Keep your med list updated and carry it with in case of emergencies. [x] Narcotic pain medications can cause the side effect of significant constipation. You may want to add a stool softener to your postoperative medication schedule or speak to your surgeon on how best to manage this side effect. NARCOTIC SAFETY:  Your pain medicine is only for you to take. Safely store your medicines. Store pills up high and out of reach of children and pets. Ensure safety caps are snapped tightly  Keep track of how many pills you have left    Unused medication can be disposed of by taking them to a drop-off box or take-back program that is authorized by the [facility]. Access to a site near you can be found on the [facility] [employer] website ([address] [facility] [place]).     If you have a CPAP machine, it is very important that you use it daily during all periods of sleep and daytime rest during your recovery at home. Surgery and Anesthesia place a significant amount of stress on your body. Using your CPAP will help keep you safe and lessen the negative effects of that stress. FOLLOW-UP RECOVERY CARE:  [x]Call the office at [phone] for follow-up appointment and problems    Watch for these possible complications, symptoms, or side effects of anesthesia. Call physician if they or any other problems occur:  Signs of INFECTION   > Fever over 101°     > Redness, swelling, hardness or warmth at the operative site   >Foul smelling or cloudy drainage at the operative site   Unrelieved PAIN  Unrelieved NAUSEA  Blood soaked dressing. (Some oozing may be normal)  Inability to urinate      Numb, pale, blue, cold or tingling extremity      Physician:  Dr. Deborah Buitrago     The above instructions were reviewed with patient/significant other. The following additional patient specific information was reviewed with the patient/significant other:  [x]Procedure/physician specific instructions  [x]Medication information sheet(S) including potential side effects  []Christinas egress test  []Pain Ball management  []FAQ Catheter associated blood stream infections  [x]FAQ Surgical Site Infections  [x]Other-Nerve/Exparel    I have read and understand the instructions given to me: ____________________________________________   (Patient/S.O. Signature)            Date/time 1/16/2023 6:39 PM         PACU:  [phone]   M-F 700 AM - 7 PM      SAME DAY SERVICES:  [phone] M-F 7AM-6PM        If you smoke STOP. We care about your health!

## 2023-01-16 NOTE — FLOWSHEET NOTE
Spoke with Oscar Henderson family waiting in waiting area. Let him know patient was going to be arriving in recovery soon and patients medications were called into to [facility] in [facility]. He stated he would run over there and pick them up and be back for patients dc to home.

## 2023-01-16 NOTE — ANESTHESIA PRE PROCEDURE
Department of Anesthesiology  Preprocedure Note       Name:  Yelitza Riggins   Age:  41 y.o.  :  1985                                          MRN:  [id number]         Date:  2023      Surgeon: Nella Chopra):  Dandre Maya MD    Procedure: Procedure(s):  RIGHT SHOULDER ARTHROSCOPY, REPAIR BONY BANKART AND LABRAL REPAIR  . Medications prior to admission:   Prior to Admission medications    Medication Sig Start Date End Date Taking? Authorizing Provider   HYDROcodone-acetaminophen (Burma Mew) 5-325 MG per tablet  23   Historical Provider, MD   ibuprofen (IBU) 800 MG tablet Take 1 tablet by mouth every 8 hours as needed for Pain 23  Lynn Bryson PA-C   acetaminophen (TYLENOL) 500 MG tablet Take 1 tablet by mouth every 6-8 hours as needed for Pain 22   Ana Paula Wells MD   albuterol sulfate HFA (VENTOLIN HFA) 108 (90 Base) MCG/ACT inhaler Inhale 2 puffs into the lungs 4 times daily as needed for Wheezing 22   Charlette Santos, APRN - CNP       Current medications:    No current facility-administered medications for this encounter. Current Outpatient Medications   Medication Sig Dispense Refill    HYDROcodone-acetaminophen (NORCO) 5-325 MG per tablet       ibuprofen (IBU) 800 MG tablet Take 1 tablet by mouth every 8 hours as needed for Pain 20 tablet 0    acetaminophen (TYLENOL) 500 MG tablet Take 1 tablet by mouth every 6-8 hours as needed for Pain 15 tablet 5    albuterol sulfate HFA (VENTOLIN HFA) 108 (90 Base) MCG/ACT inhaler Inhale 2 puffs into the lungs 4 times daily as needed for Wheezing 18 g 1       Allergies: Allergies   Allergen Reactions    Sulfa Antibiotics Swelling       Problem List:  There is no problem list on file for this patient.       Past Medical History:        Diagnosis Date    Asthma     Kidney stone     Manic depression ([facility] [address]     Meningitis     2011 - [facility] at [facility]       Past Surgical History:        Procedure Laterality Date    FRACTURE SURGERY Right     knee       Social History:    Social History     Tobacco Use    Smoking status: Every Day     Packs/day: 0.50     Years: 3.00     Pack years: 1.50     Types: Cigarettes    Smokeless tobacco: Never   Substance Use Topics    Alcohol use: No                                Ready to quit: Not Answered  Counseling given: Not Answered      Vital Signs (Current):   Vitals:    01/12/23 1138   Weight: 281 lb (127.5 kg)   Height: 5' 11\" (1.803 m)                                              BP Readings from Last 3 Encounters:   12/14/22 (!) 176/98   12/11/22 (!) 140/65   06/05/22 (!) 186/104       NPO Status:                                                                                 BMI:   Wt Readings from Last 3 Encounters:   01/12/23 281 lb (127.5 kg)   01/04/23 281 lb (127.5 kg)   12/21/22 281 lb (127.5 kg)     Body mass index is 39.19 kg/m². CBC:   Lab Results   Component Value Date/Time    WBC 9.4 02/14/2022 07:13 PM    RBC 4.74 02/14/2022 07:13 PM    HGB 15.6 02/14/2022 07:13 PM    HCT 46.1 02/14/2022 07:13 PM    MCV 97.3 02/14/2022 07:13 PM    RDW 13.9 02/14/2022 07:13 PM     02/14/2022 07:13 PM       CMP:   Lab Results   Component Value Date/Time     02/14/2022 07:13 PM    K 3.9 02/14/2022 07:13 PM     02/14/2022 07:13 PM    CO2 23 02/14/2022 07:13 PM    BUN 10 02/14/2022 07:13 PM    CREATININE 0.9 02/14/2022 07:13 PM    GFRAA >60 02/14/2022 07:13 PM    AGRATIO 1.2 06/03/2019 04:40 PM    LABGLOM >60 02/14/2022 07:13 PM    GLUCOSE 121 02/14/2022 07:13 PM    PROT 7.8 06/03/2019 04:40 PM    PROT 8.4 01/10/2012 09:32 AM    CALCIUM 8.8 02/14/2022 07:13 PM    BILITOT 0.4 06/03/2019 04:40 PM    ALKPHOS 66 06/03/2019 04:40 PM    AST 36 06/03/2019 04:40 PM    ALT 17 06/03/2019 04:40 PM       POC Tests: No results for input(s): POCGLU, POCNA, POCK, POCCL, POCBUN, POCHEMO, POCHCT in the last 72 hours.     Coags: No results found for: PROTIME, INR, APTT    HCG (If Applicable): No results found for: PREGTESTUR, PREGSERUM, HCG, HCGQUANT     ABGs: No results found for: PHART, PO2ART, AQN6ZVY, YOX3TWG, BEART, E6UJQDPG     Type & Screen (If Applicable):  No results found for: LABABO, LABRH    Drug/Infectious Status (If Applicable):  No results found for: HIV, HEPCAB    COVID-19 Screening (If Applicable):   Lab Results   Component Value Date/Time    COVID19 DETECTED 01/03/2022 01:44 PM           Anesthesia Evaluation  Patient summary reviewed and Nursing notes reviewed no history of anesthetic complications:   Airway: Mallampati: III  TM distance: >3 FB   Neck ROM: full  Mouth opening: > = 3 FB   Dental: normal exam         Pulmonary:normal exam    (+) asthma:                            Cardiovascular:  Exercise tolerance: good (>4 METS),       (-)  angina, orthopnea and PND      Rhythm: regular  Rate: normal           Beta Blocker:  Not on Beta Blocker         Neuro/Psych:   (+) psychiatric history:depression/anxiety             GI/Hepatic/Renal:   (+) morbid obesity          Endo/Other:                     Abdominal:   (+) obese,     Abdomen: soft. Vascular: Other Findings:           Anesthesia Plan      general and regional     ASA 3     (Patient was COVID positive 2 weeks ago)  Induction: intravenous. MIPS: Postoperative opioids intended and Prophylactic antiemetics administered. Anesthetic plan and risks discussed with patient. Plan discussed with CRNA and attending.                     Stacy Chiang DO   1/16/2023

## 2023-01-16 NOTE — PROGRESS NOTES
Procedure: interscalene block  MD: Dr. Antonella Rico performed. Pt monitored closely on heart monitor, 2L NC, continuous pulse oximetry, EtCO2, and frequent BPs. Pt remained alert and oriented x4. pt tolerated procedure well.   Electronically signed by Jaqueline Clark RN on 1/16/2023 at 2:03 PM

## 2023-01-16 NOTE — PROGRESS NOTES
OR made aware of patient being unable to lift his operative arm high enough to shave his entire armpit due to it feeling like it is going to pop out of place.   Electronically signed by Gordon Patterson RN on 1/16/2023 at 2:05 PM

## 2023-01-16 NOTE — ANESTHESIA PROCEDURE NOTES
Peripheral Block    Patient location during procedure: pre-op  Reason for block: procedure for pain, post-op pain management and at surgeon's request  Start time: 1/16/2023 1:55 PM  End time: 1/16/2023 2:01 PM  Staffing  Performed: anesthesiologist   Anesthesiologist: Viet Alexandre DO  Preanesthetic Checklist  Completed: patient identified, IV checked, site marked, risks and benefits discussed, surgical/procedural consents, equipment checked, pre-op evaluation, timeout performed, anesthesia consent given, oxygen available, monitors applied/VS acknowledged, fire risk safety assessment completed and verbalized and blood product R/B/A discussed and consented  Peripheral Block   Patient position: supine  Prep: ChloraPrep  Provider prep: mask  Patient monitoring: cardiac monitor, continuous capnometry, frequent blood pressure checks, IV access, oxygen and responsive to questions  Block type: Brachial plexus  Interscalene  Laterality: right  Injection technique: single-shot  Guidance: ultrasound guided    Needle   Needle type: insulated echogenic nerve stimulator needle   Needle gauge: 20 G  Needle localization: ultrasound guidance  Needle length: 8 cm  Assessment   Injection assessment: negative aspiration for heme, no paresthesia on injection, local visualized surrounding nerve on ultrasound, no intravascular symptoms and low pressure verified by pressure monitor  Paresthesia pain: none  Slow fractionated injection: yes  Hemodynamics: stable  Real-time US image taken/store: yes  Outcomes: uncomplicated    Additional Notes  0.25% bupi-25mL  Exparel 5mL

## 2023-01-16 NOTE — ANESTHESIA POSTPROCEDURE EVALUATION
Department of Anesthesiology  Postprocedure Note    Patient: Martita Parmar  MRN: [id number]  YOB: 1985  Date of evaluation: 1/16/2023      Procedure Summary     Date: 01/16/23 Room / Location: [address] [place] [address] [address] [place] [address] / [facility]    Anesthesia Start: 2209 Anesthesia Stop: 7578    Procedure: RIGHT SHOULDER ARTHROSCOPY, ARTHROSCOPIC EXTENSIVE DEBRIDEMENT, LYSIS OF ADHESIONS, ARTHROSCOPIC REPAIR OF BONY BANKART REPAIR AND ARTHROSCOPIC ANTERIOR LABRAL REPAIR (Right: Shoulder) Diagnosis:       Closed fracture of right shoulder, initial encounter      (Closed fracture of right shoulder, initial encounter Jose Elias Davis)    Surgeons: Birttney Saldana MD Responsible Provider: Ni John DO    Anesthesia Type: general, regional ASA Status: 3          Anesthesia Type: No value filed.     Andrea Phase I: Andrea Score: 7    Andrea Phase II:        Anesthesia Post Evaluation    Patient location during evaluation: PACU  Patient participation: complete - patient participated  Level of consciousness: awake and alert  Pain score: 0  Airway patency: patent  Nausea & Vomiting: no nausea and no vomiting  Complications: no  Cardiovascular status: hemodynamically stable  Respiratory status: acceptable  Hydration status: euvolemic

## 2023-01-16 NOTE — PROGRESS NOTES
Patient received from OR to PACU 11 s/p RIGHT SHOULDER ARTHROSCOPY, ARTHROSCOPIC EXTENSIVE DEBRIDEMENT, LYSIS OF ADHESIONS, ARTHROSCOPIC REPAIR OF BONY BANKART REPAIR AND ARTHROSCOPIC ANTERIOR LABRAL REPAIR - Right with Dr. Melinda Palacio. Placed on PACU monitor. Report received from CRNA/ OR staff Per report patient was given Nerve block/exparel. Patient was stable intra op. Patient shows no signs of pain or distress.

## 2023-01-16 NOTE — PROGRESS NOTES
IS education given to the patient. Patient reached 2500mL pre-operatively. Right shoulder clipped and norman wiped. MARY hose applied to bilateral legs.   Electronically signed by Patricia Hedrick RN on 1/16/2023 at 1:51 PM

## 2023-01-17 NOTE — FLOWSHEET NOTE
Patient discharged at this time due to waiting for family at the hospital entrance door for . They left the hospital to \"get food\".

## 2023-01-17 NOTE — PROGRESS NOTES
Ambulatory Surgery/Procedure Discharge Note    Vitals:    01/16/23 2045   BP: 167/99   Pulse: 83   Resp: 24   Temp: 97.8   SpO2: 92   BP is within 20% of baseline value. Dr. Christine Ceja is aware of BP and okay with proceeding with discharge. In: 5 [P.O.:120; I.V.:300]  Out: 650 [Urine:650]    Restroom use offered before discharge. Yes, voided x3 in pacu    Pain assessment:  present - adequately treated, had pre op nerve block, pain pill given prior to discharge  Pain Level: 4    Discharge instructions reviewed with patient and his girlfriend. They verbalize understanding. Prescriptions have been picked up per family. Copy of instructions given. Patient discharged to home/self care.  Patient discharged via wheel chair with all belongings by RN to waiting family/S.O.       1/16/2023 9:08 PM

## 2023-01-17 NOTE — OP NOTE
[address] [facility]               [address]                                OPERATIVE REPORT    PATIENT NAME: Magalie Ryder                    :        1985  MED REC NO:   [id number]                          ROOM:  ACCOUNT NO:   [de-identified]                           ADMIT DATE: 2023  PROVIDER:     Rodriguez Merrill MD    DATE OF PROCEDURE:  2023    PREOPERATIVE DIAGNOSIS:  Status post right shoulder glenohumeral  dislocation with subacute bony Bankart lesion. POSTOPERATIVE DIAGNOSES:  Status post right shoulder glenohumeral  dislocation with sub acute bony Bankart lesion with additional labrum  tear and posttraumatic adhesions. PROCEDURES:  Right shoulder examination under anesthesia; diagnostic  arthroscopy; arthroscopic release of adhesions; arthroscopic extensive  debridement of labrum, glenoid bone, rotator interval, arthroscopic  repair of bony Bankart lesion with double row transosseous suture anchor  repair and adjunctive anterior labrum repair. Modifier 22 applied because of the extreme complexity brought on by the  subacute nature which required meticulous separation of the glenoid bone  fragment from the native glenoid. In addition, the patient is very  muscular with a BMI approaching 40 which added additional work and  essentially doubled the time to do the case. We need a specialized  cannula that were thick enough or long enough and we needed additional  effort in order to reduce the shoulder provisionally to allow for  reduction of bony fragment and provisional repair. ANESTHESIA:  General anesthesia, interscalene block. IV FLUIDS:  1000 mL of crystalloid. ESTIMATED BLOOD LOSS:  25 mL. COMPLICATIONS:  None.     SURGEON:  Rodriguez Merrill MD    ASSISTANTS:  Lesley Casas MD.  The availability of Dr. Monica Loving was  critically important thus he had to hold the arthroscopy during much of  the repair and also assist with arm positioning in order to create  adequate space especially in this very bulky muscular man. IMPLANT:  Arthrex BioComposite Suture Wilder 3.0 x3 and Arthrex  Biocomposite PushLock   2.9 mm x2. FINDINGS:  Examination under anesthesia revealed slightly subluxed  approached glenohumeral joint. Diagnostic arthroscopy revealed an  obvious bony Bankart lesion which was rotated and displaced. This  required meticulous separation. Bone quality was good. We had to  prepare the bony bed. But we were not able to save the entire fragment  as one and it did fragment likely because of having this  out  where it was healed. We had to use a 70-degree scope for most of the  procedure. There was still adequate labral tissue and capsular tissue  that could be incorporated in the repair. There was no     SLAP tear. No posterior labrum tear. Biceps was normal.  There was a very small  peripheral Carnella Kudo lesion which was important because it allowed us  to do the arthroscopic procedure without wrong pulse lavage. The biceps  looked normal.  There was some scarring of the rotator interval as well  as some capsular thickening and this needed to be debrided and debulked. BRIEF HISTORY AND PRESENTING ILLNESS:  The patient is a 72-year-old  gentleman who suffered traumatic dislocation and there was a delay in  care. He actually bounced around a lot. He did have a closed reduction  and a subsequent workup revealed a bony Bankart tear. Finally he came  to see me. I recommended early surgery. He was scheduled for surgery  just four days after his presentation. He is cleared medically. He  understood what to expect and understood his arm would be in a brace for  a period of time. He understood there were risks including the need for  further surgery, bleeding, infection, anesthetic risks, injury to nerves  and blood vessels, residual instability.   He understood all these and  wished to proceed and gave informed consent. He was scheduled again  semi-urgently. DESCRIPTION OF PROCEDURE:  On the date of procedure, the patient was  taken back to the operating room and placed supine on the operating  table. General anesthesia was established. Preoperative antibiotics  and interscalene block had been given in the holding area. Under  anesthesia, examination was carried out with findings as noted above. The patient was then positioned with a full body beanbag in a lateral  decubitus position. The operative right side, all pressure points were  padded. We placed an axillary roll. The patient's right shoulder and  arm were prepped and draped in standard manner for arthroscopic shoulder  stabilization. We used 10 pounds to 12 pounds of balanced suspension. An Arthrex STaR sleeve was used during critical portion of the  procedure. We called time-out and then used 18-gauge spinal needle to  determine trajectory for posterior lateral portal.  We induced the  arthroscope and glenohumeral joint. Systematic diagnostic arthroscope  findings are noted above. We established an anterior portal then  rotator interval with arthroscopic shaver across the metal cannula was  used to debrided the rotator interval.  We debrided some of the scar as  well there was some adhesions in the bony fragments in the native  glenoid. We quickly realized that standard length cannulas were too  short. This was after going to an 8-mm cannula using Switching stick  and dilators so we switch to a 9.25 cannula which is quite longer and  this was used throughout the procedure. We used Liberator elevator and  switched to 70-degree scope. We could see the end of the glenoid. Thus  we would keep the arthroscope in the back. We meticulously   out the fragments and began that towards the medial end of the fragment  tapered and was very thin and did fragment. There was some scar and we  debrided the scar. Subsequently we got down to the subscapularis  muscles. Fibers were still very healthy capsular tissue and labrum  could be preserved as well. We used a shaver operating as a velma to lightly abrade the ends of the  scapular neck area. At this point, we used a percutaneous kit from  Arthrex to get a 3-mm guide. This took a while because the humeral head  wanted to sit more subluxed anteriorly. We used a STaR sleeve and also  we used one of the assistance to manipulate the arm and finally we were  able to get the long spinal needle and then the Nitinol wire and then the  dilator and then the guide. We initially placed one anchor far  immediately so we could use a transosseous equivalent repair. We tried  to put two anchors. We really could not get down far enough inferiorly  because of the patient's habitus, so we used a single double-loaded 3.0  anchor. We used Linvatec spectrum on the right 45 attachment to  penetrate the capsule and the bony fragment passing a PDS suture and  then using that to shuttle sequentially each of the four limbs. We  shuttled one, made another pass, shuttled the other so we had a  horizontal mattress stitch and I repeat the process identically close to  3 o'clock. This took a while in order to avoid suture entanglement. Once I had this done, we tied the sutures in pants-over-vest type  fashion out the 9 mm cannula with a Revo-knot followed by alternating  half hitches and then we did same thing to the other tear. Then we  retrieved one limb of each of the pairs of tails, one blue and one white  and these two were loaded on to our PushLock anchor. We had to drill  over the guide, first drill was around 4 o'clock position, second one  was around 2 o'clock and then we had a transosseous equivalent repair. We used a flush cutter to cut the tail short once the PushLock was  impacted into place.   Although this helped reduce the fragments, we  still saw that the tear extended little bit more inferiorly and we also  wanted to reinforce it and get a better labral bite in the middle so we  placed an anchor at 3 o'clock which is double loaded 3.0 Biocomposite  Suture Wilder. We passed both sutures one after the other through the  capsular labral tissue again using Linvatec spectrum, this time not  going through the bone and then using a PDS shuttle to relay the suture. We tied the simple stitches and repeated the process identically. We  had another anchor right at 5 o'clock position. At this point, we had a  very nice repair with essentially six sutures and felt that there was no  need for any adjunctive repair inferiorly or posteriorly in this  traumatic case. The humeral head centered itself nicely allowing us to  get that 5 o'clock anchor and the sutures passed through the capsular  labral tissue. We documented arthroscopic images. We copiously  irrigated the glenohumeral joint. We withdrew all arthroscopic  instruments to include the arthroscope and then closed the portals, the  anterior portal was larger one. This was closed with layered closure  with 2-0 Vicryl in an interrupted inverted fashion and skin closure  with 4-0 Monocryl closure and Prineo dressing. The same was for the  anterior inferior portals which were two as well as our standard  posterior portal.  Sterile compressive dressings were applied. Arm was  placed in a padded soft brace. The patient was repositioned in the  supine position, promptly awoken from anesthesia, having tolerated the  procedure well and taken from the operating room to the recovery room in  satisfactory condition. PLAN:  The patient will be discharged on oral analgesics with delayed  rehab program.  To keep the arm in the brace for at least a week and  then start gentle Codman exercises. We will get a postoperative x-ray  in the office.         Hugh Gama MD    D: 01/16/2023 18:17:18       T: 01/17/2023 1:18:29     [email or web address]  Job#: [id number]     Doc#: [id number]

## 2023-01-20 ENCOUNTER — TELEPHONE (OUTPATIENT)
Dept: ORTHOPEDIC SURGERY | Age: 38
End: 2023-01-20

## 2023-01-20 DIAGNOSIS — S42.91XA: Primary | ICD-10-CM

## 2023-01-20 RX ORDER — OXYCODONE HYDROCHLORIDE AND ACETAMINOPHEN 5; 325 MG/1; MG/1
1 TABLET ORAL EVERY 6 HOURS PRN
Qty: 30 TABLET | Refills: 0 | Status: SHIPPED | OUTPATIENT
Start: 2023-01-20 | End: 2023-01-30

## 2023-01-20 RX ORDER — OXYCODONE HYDROCHLORIDE AND ACETAMINOPHEN 5; 325 MG/1; MG/1
1 TABLET ORAL EVERY 6 HOURS PRN
Qty: 28 TABLET | Refills: 0 | Status: SHIPPED | OUTPATIENT
Start: 2023-01-20 | End: 2023-01-27

## 2023-01-20 NOTE — TELEPHONE ENCOUNTER
Prescription Refill     Medication Name: OXYCODONE  Pharm[facility]: [facility]  PATIENT REQUESTING A CALL BACK.   Patient Contact Number: [phone]

## 2023-01-24 ENCOUNTER — OFFICE VISIT (OUTPATIENT)
Dept: ORTHOPEDIC SURGERY | Age: 38
End: 2023-01-24

## 2023-01-24 ENCOUNTER — TELEPHONE (OUTPATIENT)
Dept: ORTHOPEDIC SURGERY | Age: 38
End: 2023-01-24

## 2023-01-24 VITALS — WEIGHT: 271 LBS | BODY MASS INDEX: 37.94 KG/M2 | HEIGHT: 71 IN

## 2023-01-24 DIAGNOSIS — S42.91XA: Primary | ICD-10-CM

## 2023-01-24 DIAGNOSIS — Z98.890 S/P SHOULDER SURGERY: ICD-10-CM

## 2023-01-24 PROCEDURE — 99024 POSTOP FOLLOW-UP VISIT: CPT | Performed by: ORTHOPAEDIC SURGERY

## 2023-01-24 RX ORDER — OXYCODONE HYDROCHLORIDE AND ACETAMINOPHEN 5; 325 MG/1; MG/1
1 TABLET ORAL EVERY 8 HOURS PRN
Qty: 28 TABLET | Refills: 0 | Status: SHIPPED | OUTPATIENT
Start: 2023-01-24 | End: 2023-01-31

## 2023-01-24 NOTE — TELEPHONE ENCOUNTER
Prescription Refill     Medication Name:  [address]: [place]   Patient Contact Number: [phone]      PATIENT WAS IN TO SEE DOCTOR LEONARDO THIS MORNING AND HE WANTS TO VERIFY WHAT LOCATION HIS PRESCRIPTION WAS SENT TO    PATIENT REQUESTING [place]    PLEASE CALL BACK PATIENT AT THE ABOVE NUMBER

## 2023-01-24 NOTE — LETTER
Shoulder Elbow Rehabilitation Referral    Patient Name: Yulissa Rubio      YOB: 1985    Diagnosis:   1. Closed fracture of shoulder, right, initial encounter    2. S/P shoulder surgery        Precautions:      Post Op Instructions:  [] Continuous passive motion (CPM)  [x] Active Elbow range of motion  [] Exercise in plane of scapula   []  Strengthening     [] Pulley and instruction    [x] Home exercise program (copy to patient)   [] Sling when arm at risk  [x] Sling or brace at all times   [] AAROM: Forward elevation to             [] AAROM: External rotation to     [] Isometric external rotator strengthening [] AAROM: internal rotation: up the back  [x] Isometric abductor strengthening  [] AAROM: Internal abduction     [] Isometric internal rotator strengthening [] AAROM: cross-body adduction             Stretching:     Strengthening:  [] Four quadrant (FE, ER, IR, CBA)  [] Rotator cuff (ER, IR, Abd)  [] Forward Elevation    [] External Rotators     [] External Rotation    [] Internal Rotators  [] Internal Rotation: up/back   [] Abductors     [] Internal Rotation: supine in abduction  [] Flexors  [] Cross-body abduction    [] Extensors  [x] Pendulum (FE, Abd/Add, cw/ccw)  [x] Scapular Stabilizers   [] Wall-walking (FE, Abd)    [x] Shoulder shrugs     [] Table slides      [x] Rhomboid pinch  [] Elbow (flex, ext, pron, sup)    [] Lat.  Pull downs     [] Medial epicondylitis program    [] Forward punch   [] Lateral epicondylitis program    [] Internal rotators     [] Progressive resistive exercises  [] Bench Press        [] Bench press plus  Activities:     [] Lateral pull-downs  [] Rowing     [] Progressive two-hand supine press  [] Stepper/Exercise bike   [] Biceps: curls/supination  [] Swimming  [] Water exercises    Modalities: PRN    Return to Sport:  [] Ultrasound     [] Plyometrics  [] Iontophoresis     [] Rhythmic stabilization  [] Moist heat     [] Core strengthening   [] Massage     [] Sports specific program:   [x] Cryotherapy      [] Electrical stimulation     [] Paraffin  [] Whirlpool  [] TENS    [x] Home exercise program (copy to patient). Perform exercises for:   15     minutes    2-3      times/day  [x] Supervised physical therapy  Frequency: []  1x week  [x] 2x week  [] 3x week  [] Other:   Duration: [] 2 weeks   [] 4 weeks  [x] 6 weeks  [] Other:     Additional Instructions: At 3 weeks postop he may begin AA forward elevation to 90 and external rotation to 20        Anuel Holt MD, PhD

## 2023-01-24 NOTE — LETTER
[facility]  [address] [facility] [address]  Phone: [phone]  Fax: [phone]    Pearlean Holter, MD        January 24, 2023     Patient: Tomas Garcia   YOB: 1985   Date of Visit: 1/24/2023       To Whom It May Concern: It is my medical opinion that [address] [place] [address] should remain out of work until seen again in 2 weeks . If you have any questions or concerns, please don't hesitate to call.     Sincerely,          Pearlean Holter, MD

## 2023-01-24 NOTE — PROGRESS NOTES
History of Present Illness:  Otoniel Haas is a pleasant 41 y.o. male who presents for a post operative visit. He is 8 days out following a right shoulder arthroscopic debridement, repair of bony Bankart lesion with adjunctive anterior labrum repair on 1/16/23. Overall He is doing okay and feels that their pain is well controlled with current pain medications. He has been compliant with wearing the UltraSling brace at all times. He plans to do physical therapy at the [facility] office. He is having some numbness in his mouth - he believes this is from the anaesthesia. He denies fevers, chills, numbness, tingling, and shortness of breath. Medical History:  Patient's medications, allergies, past medical, surgical, social and family histories were reviewed and updated as appropriate. No notes on file    Review of Systems  A 14 point review of systems was completed by the patient and is available in the media section of the scanned medical record and was reviewed on 1/24/2023. Vital Signs: There were no vitals filed for this visit. General/Appearance: Alert and oriented and in no apparent distress. Skin:  There are no skin lesions, cellulitis, or extreme edema. The patient has warm and well-perfused Bilateral upper extremities with brisk capillary refill. Right Shoulder Exam:    Inspection: Shoulder incision portals are clean, dry and intact and well approximated. The Prineo dressing is still in place. Mild ecchymosis and swelling are present as can be expected. There is no erythema, drainage or other signs of infection    Palpation:  No crepitus to gentle motion    Active Range of Motion: Deferred    Passive Range of Motion:  Tolerates gentle pendulums well    Strength:  Deferred    Special Tests:  Deferred.     Neurovascular: Sensation to light touch is intact, no motor deficits, palpable radial pulses 2+    Radiology:     Plain radiographs of the right shoulder comprising 3 views:  were obtained and reviewed in the office: Shows postsurgical changes from the bony bankart repair. All the components are in good placement without any signs of loosening, fractures, subluxations or dislocations. Impression: Stable postop x-ray. Assessment :  Mr. Chele Doran is a pleasant 41 y.o. patient who is 8 days out following a right shoulder arthroscopic debridement, repair of bony Bankart lesion with adjunctive anterior labrum repair on 1/16/23. Impression:  Encounter Diagnoses   Name Primary? Closed fracture of shoulder, right, initial encounter Yes    S/P shoulder surgery        Office Procedures:  Orders Placed This Encounter   Procedures    XR SHOULDER RIGHT (MIN 2 VIEWS)     Standing Status:   Future     Number of Occurrences:   1     Standing Expiration Date:   1/24/2024     Order Specific Question:   Reason for exam:     Answer:   [address] ([facility])- [facility]     Referral Priority:   Routine     Referral Type:   Eval and Treat     Referral Reason:   Specialty Services Required     Requested Specialty:   Physical Therapist     Number of Visits Requested:   1       Treatment Plan:    Overall Chele Doran is doing well. The pain is well-controlled. We recommend that He wear the UltraSling brace at all times with the exception of clothing, bathing and physical therapy. The patient was told that he is restricted from driving for at least 3 weeks postop. We would like for him to go ahead and begin formal physical therapy once per week for now. We expect the numbness in his mouth to resolve over the next few days, however we will have him touch base with the anaesthesiologist. Lastly we will call in [address] [place] [address],[address] to his pharmacy for continued postoperative pain. All of his questions were fully answered today. We would like to see Chele Doran back in 2 weeks for follow-up visit at which time we will plan to obtain repeat radiographs.  Bridgette Santillan Phyllisterrell Wedlonill is in agreement with this plan. 1/24/2023  9:53 AM    Yolanda Maurer ATC  [facility] [address]    During this examination, I, Manuelito Mccain, functioned as a scribe for Dr. Eladio Mckeon. The history taking and physical examination were performed by Dr. Joshua Marcano. All counseling during the appointment was performed between the patient and Dr. Joshua Marcano. 1/24/2023  ______________________  I, Dr. Eladio Mckeon, personally performed the services described in this documentation as described by Yolanda Maurer ATC in my presence, and it is both accurate and complete. Anuel Marcano MD, PhD  1/24/2023

## 2023-01-27 ENCOUNTER — TELEPHONE (OUTPATIENT)
Dept: ORTHOPEDIC SURGERY | Age: 38
End: 2023-01-27

## 2023-01-27 NOTE — TELEPHONE ENCOUNTER
Faxed completed fmla / ADA to  [facility]    LR# [id number]

## 2023-02-06 ENCOUNTER — TELEPHONE (OUTPATIENT)
Dept: ORTHOPEDIC SURGERY | Age: 38
End: 2023-02-06

## 2023-02-06 DIAGNOSIS — S42.91XA: ICD-10-CM

## 2023-02-06 RX ORDER — OXYCODONE HYDROCHLORIDE AND ACETAMINOPHEN 5; 325 MG/1; MG/1
1 TABLET ORAL EVERY 8 HOURS PRN
Qty: 28 TABLET | Refills: 0 | Status: SHIPPED | OUTPATIENT
Start: 2023-02-06 | End: 2023-02-13
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
## 2023-02-06 NOTE — TELEPHONE ENCOUNTER
Other PATIENT IS HAVING RADIATING PAIN BEHIND SHOULDERS AND CLICKING AND POPPING BEHIND SURGICAL SIDE. ALSO PATIENT STILL HAS NUMBNESS IN TONGUE.  REACHED OUT TO TRIAGE TEAM

## 2023-02-08 ENCOUNTER — OFFICE VISIT (OUTPATIENT)
Dept: ORTHOPEDIC SURGERY | Age: 38
End: 2023-02-08

## 2023-02-08 VITALS — WEIGHT: 271 LBS | BODY MASS INDEX: 37.94 KG/M2 | HEIGHT: 71 IN

## 2023-02-08 DIAGNOSIS — Z98.890 STATUS POST LABRAL REPAIR OF SHOULDER: Primary | ICD-10-CM

## 2023-02-08 PROCEDURE — 99024 POSTOP FOLLOW-UP VISIT: CPT | Performed by: PHYSICIAN ASSISTANT

## 2023-02-08 NOTE — LETTER
Physical Therapy Rehabilitation Referral    Patient Name:  Krishan Caraballo      YOB: 1985    Diagnosis:  Right shoulder examination under anesthesia; diagnostic  arthroscopy; arthroscopic release of adhesions; arthroscopic extensive  debridement of labrum, glenoid bone, rotator interval, arthroscopic  repair of bony Bankart lesion with double row transosseous suture anchor  repair and adjunctive anterior labrum repair. 1/16/2023  Precautions:     [x] Evaluate and Treat    Post Op Instructions:  [] Continuous passive motion (CPM)  [x] Elbow ROM  [x] Exercise in plane of scapula  [x]  Strengthening     [x] Pulley and instruction   [x] Home exercise program (copy to patient)   [] Sling when arm at risk  [] Sling or brace at all times   [x] AAROM: Forward elevation to  90            [x] AAROM: External rotation  To  20    [] Isometric external rotator strengthening [] AAROM: internal rotation: up the back  [x] Isometric abductor strengthening  [] AAROM: Internal abduction   [] Isometric internal rotator strengthening [] AAROM: cross-body adduction             Stretching:     Strengthening:  [] Four quadrant (FE, ER, IR, CBA)  [] Rotator cuff (ER, IR, Abd)  [x] Forward Elevation    [] External Rotators     [x] External Rotation    [] Internal Rotators  [] Internal Rotation: up/back   [] Abductors     [] Internal Rotation: supine in abduction  [] Sleeper Stretch    [] Flexors  [] Cross-body abduction    [] Extensors  [x] Pendulum (FE, Abd/Add, cw/ccw)  [x] Scapular Stabilizers   [x] Wall-walking (FE, Abd)        [x] Shoulder shrugs     [x] Table slides (FE)                [x] Rhomboid pinch  [] Elbow (flex, ext, pron, sup)        [] Lat.  Pull downs     [] Medial epicondylitis program       [] Forward punch   [] Lateral epicondylitis program       [] Internal rotators     [] Progressive resistive exercises  [] Bench Press        [] Bench press plus  Activities:     [] Lateral pull-downs  [] Rowing     [] Progressive two-hand supine press  [] Stepper/Exercise bike   [] Biceps: curls/supination  [] Swimming  [] Water exercises    Modalities:     Return to Sport:  [x] Of Choice      [] Plyometrics  [] Ultrasound     [] Rhythmic stabilization  [] Iontophoresis    [] Core strengthening   [] Moist heat     [] Sports specific program:   [] Massage         [x] Cryotherapy      [] Electrical stimulation     [] Paraffin  [] Whirlpool  [] TENS    [x] Home exercise program (copy to patient). Perform exercises for:   15     minutes    3      times/day  [x] Supervised physical therapy  Frequency: []  1x week  [x] 2x week  [] 3x week  [] Other:   Duration: [] 2 weeks   [] 4 weeks  [x] 6 weeks  [] Other:     Additional Instructions:     Anuel Ward MD, PhD

## 2023-02-08 NOTE — PROGRESS NOTES
History of Present Illness:  Betsy Sim is a pleasant 41 y.o. male who presents for a post operative visit. He is 8 days out following a right shoulder arthroscopic debridement, repair of bony Bankart lesion with adjunctive anterior labrum repair on 1/16/23. Reports he has been wearing the sling at home but did not bring it with him for his appointment today. Patient has also not started any formal physical therapy. Patient reports he has been experiencing some coughing as well as experiencing some numbness and tingling at the tip of his tongue. He denies fevers, chills, numbness, tingling, and shortness of breath. Medical History:  Patient's medications, allergies, past medical, surgical, social and family histories were reviewed and updated as appropriate. No notes on file    Review of Systems  A 14 point review of systems was completed by the patient and is available in the media section of the scanned medical record and was reviewed on 2/8/2023. Vital Signs: There were no vitals filed for this visit. General/Appearance: Alert and oriented and in no apparent distress. Of note the patient did appear to be under the influence during his visit today. Skin:  There are no skin lesions, cellulitis, or extreme edema. The patient has warm and well-perfused Bilateral upper extremities with brisk capillary refill. Right Shoulder Exam:    Inspection: Shoulder incision portals are clean, dry and intact and well approximated. The Prineo dressing is still in place. Mild ecchymosis and swelling are present as can be expected. There is no erythema, drainage or other signs of infection    Palpation:  No crepitus to gentle motion    Active Range of Motion: Deferred    Passive Range of Motion: Forward elevation of 50 degrees with pain.   Strength:  Deferred    Neurovascular: Sensation to light touch is intact, no motor deficits, palpable radial pulses 2+    Radiology:     Plain radiographs not obtained today. Assessment :  Mr. Betsy Sim is a pleasant 41 y.o. patient who had a right shoulder arthroscopic debridement, repair of bony Bankart lesion with adjunctive anterior labrum repair on 1/16/23. Impression:  Encounter Diagnosis   Name Primary? Status post labral repair of shoulder Yes         Office Procedures:  No orders of the defined types were placed in this encounter. Treatment Plan:    Patient was encouraged to keep his sling on primarily when he is outdoors in big crowds. He was asked to start formal physical therapy twice a week. Patient was told that he is going to start stiffening up if he does not get into therapy. His therapy orders were placed in the chart today. The patient was asked to touch base with the anaesthesiologist regarding some of the numbness and tingling on his face. He was also asked to follow-up with a primary care physician regarding the coughing that he has been experiencing. Betsy Sim is in agreement with this plan. We will see him back in 3 weeks for follow-up visit. 2/8/2023  5:09 PM    Donny Son PA-C  Orthopaedic Sports Medicine Physician Assistant    This dictation was performed with a verbal recognition program [facility] ) and it was checked for errors. It is possible that there are still dictated errors within this office note. If so, please bring any errors to my attention for an addendum. All efforts were made to ensure that this office note is accurate.

## 2023-02-08 NOTE — LETTER
[address]  [address] [facility] [address]  Phone: [phone]  Fax: [phone]    Franny Flores        February 8, 2023     Patient: Harry Anderson   YOB: 1985   Date of Visit: 2/8/2023       To Whom It May Concern: It is my medical opinion that Fidel Leonard may return to work on 2/14/23 with the following restrictions:desk work only lifting/carrying not to exceed 0 lbs. , pushing/pulling not to exceed 0 lbs. , must wear splint/sling . If you have any questions or concerns, please don't hesitate to call.     Sincerely,        Kim Stokes PA-C

## 2023-02-14 ENCOUNTER — HOSPITAL ENCOUNTER (OUTPATIENT)
Dept: PHYSICAL THERAPY | Age: 38
Setting detail: THERAPIES SERIES
Discharge: HOME | End: 2023-02-14

## 2023-02-14 NOTE — FLOWSHEET NOTE
The [facility] and [address],  Sports Performance and Rehabilitation, [facility] [address]  [address] [facility],5[address] Floor   Letty Bentley  Phone: [phone]  Fax: [phone]      Physical Therapy  Cancellation/No-show Note  Patient Name:  Zoë Patricia  :  1985   Date:  2023  Cancelled visits to date: 0  No-shows to date: 1 (Evaluation)    For today's appointment patient:  []  Cancelled  []  Rescheduled appointment  [x]  No-show     Reason given by patient:  []  Patient ill  []  Conflicting appointment   []  No transportation    []  Conflict with work  []  No reason given  [x]  Other:     Comments:  Patient did not show for PT initial evaluation appointment.      Electronically signed by:  Vee Raya PT, OMT-C

## 2023-02-17 ENCOUNTER — TELEPHONE (OUTPATIENT)
Dept: ORTHOPEDIC SURGERY | Age: 38
End: 2023-02-17

## 2023-02-17 DIAGNOSIS — Z98.890 STATUS POST LABRAL REPAIR OF SHOULDER: Primary | ICD-10-CM

## 2023-02-17 RX ORDER — TRAMADOL HYDROCHLORIDE 50 MG/1
50 TABLET ORAL EVERY 6 HOURS PRN
Qty: 28 TABLET | Refills: 0 | Status: SHIPPED | OUTPATIENT
Start: 2023-02-17 | End: 2023-02-24

## 2023-02-21 ENCOUNTER — HOSPITAL ENCOUNTER (OUTPATIENT)
Dept: PHYSICAL THERAPY | Age: 38
Setting detail: THERAPIES SERIES
Discharge: HOME | End: 2023-02-21

## 2023-02-21 NOTE — FLOWSHEET NOTE
The [facility] and [address],  Sports Performance and Rehabilitation, [facility] [address]  [address] [facility],5Th Floor   Letty Bentley  Phone: [phone]  Fax: [phone]      Physical Therapy  Cancellation/No-show Note  Patient Name:  Velma Klinefelter  :  1985   Date:  2023  Cancelled visits to date: 0  No-shows to date: 2 (Evaluation x 2)    For today's appointment patient:  []  Cancelled  []  Rescheduled appointment  [x]  No-show     Reason given by patient:  []  Patient ill  []  Conflicting appointment   []  No transportation    []  Conflict with work  []  No reason given  [x]  Other:     Comments:  Patient did not show for PT initial evaluation appointment for a second time.      Electronically signed by:  Brianna Estevez PT, LUCIA

## 2023-02-22 ENCOUNTER — TELEPHONE (OUTPATIENT)
Dept: ORTHOPEDIC SURGERY | Age: 38
End: 2023-02-22

## 2023-02-22 DIAGNOSIS — S42.91XA: ICD-10-CM

## 2023-02-22 DIAGNOSIS — Z98.890 STATUS POST LABRAL REPAIR OF SHOULDER: Primary | ICD-10-CM

## 2023-02-27 ENCOUNTER — TELEPHONE (OUTPATIENT)
Dept: ORTHOPEDIC SURGERY | Age: 38
End: 2023-02-27

## 2023-02-27 NOTE — TELEPHONE ENCOUNTER
General Question     Subject: Return To Work Documents  Patient and /or Facility Request: [address], [address] Number: [phone]     Patient request call back regarding Return to Work paperwork/note.   Please advise Patient is AAOx3. patient is NPO at this time. Lung fields clear to auscultation. Bowel 
sounds present. Abdomen tender to touch. No c/o pain at this time. No edema noted. Right AC 
IV in place. IV fluids infusing

## 2023-02-28 ENCOUNTER — TELEPHONE (OUTPATIENT)
Dept: ORTHOPEDIC SURGERY | Age: 38
End: 2023-02-28

## 2023-03-01 ENCOUNTER — TELEPHONE (OUTPATIENT)
Dept: ORTHOPEDIC SURGERY | Age: 38
End: 2023-03-01

## 2023-03-01 DIAGNOSIS — Z98.890 STATUS POST LABRAL REPAIR OF SHOULDER: ICD-10-CM

## 2023-03-01 RX ORDER — TRAMADOL HYDROCHLORIDE 50 MG/1
50 TABLET ORAL EVERY 6 HOURS PRN
Qty: 28 TABLET | Refills: 0 | Status: SHIPPED | OUTPATIENT
Start: 2023-03-01 | End: 2023-03-08

## 2023-03-01 NOTE — TELEPHONE ENCOUNTER
Prescription Refill     Medication Name:  TRAMADOL  Pharmacy: [facility]  Patient Contact Number:  [phone]

## 2023-03-02 ENCOUNTER — TELEPHONE (OUTPATIENT)
Dept: ORTHOPEDIC SURGERY | Age: 38
End: 2023-03-02

## 2023-03-02 NOTE — TELEPHONE ENCOUNTER
Prescription Refill     Medication Name:  TRAMADOL  Pharmacy: [facility]  Patient Contact Number:  [phone]    PATIENT IS REQUESTING TO BE RELEASED TO [facility] WITH NO RESTRICTIONS ASAP

## 2023-03-02 NOTE — TELEPHONE ENCOUNTER
General Question     Subject: RETURN TO WORK NOTE & PRESCRIPTION  Patient and /or Facility Request: [facility]  Contact Number: [phone]      PT HAD JUST CALLED TO GET A RETURN TO WORK NOTE WITH NO RESTRICTIONS AND A PRESCRIPTION FOR TRAMADOL. WHEN I WAS SENDING ANOTHER MESSAGE, I NOTICED THE NOTE STATING THAT HE NEEDED TO SCHEDULE AN APPT SINCE HE MISSED HIS LAST APPT., SO I CALLED THE PT BACK AND LEFT A MESSAGE REGARDING THIS.

## 2023-03-07 ENCOUNTER — HOSPITAL ENCOUNTER (OUTPATIENT)
Dept: PHYSICAL THERAPY | Age: 38
Setting detail: THERAPIES SERIES
Discharge: HOME | End: 2023-03-07

## 2023-03-07 NOTE — FLOWSHEET NOTE
The [facility] and [address],  Sports Performance and Rehabilitation, [facility] [address]  [address] [facility],5[address] Floor   Letty Bentley  Phone: [phone]  Fax: [phone]      Physical Therapy  Cancellation/No-show Note  Patient Name:  Ady Alcantara  :  1985   Date:  3/7/2023  Cancelled visits to date: 0  No-shows to date: 3 (Evaluation x 3)    For today's appointment patient:  []  Cancelled  []  Rescheduled appointment  [x]  No-show     Reason given by patient:  []  Patient ill  []  Conflicting appointment   []  No transportation    []  Conflict with work  []  No reason given  [x]  Other:     Comments:  Patient did not show for PT initial evaluation appointment for a third time.      Electronically signed by:  Ana Diaz PT, OMT-C

## 2023-03-10 ENCOUNTER — TELEPHONE (OUTPATIENT)
Dept: ORTHOPEDIC SURGERY | Age: 38
End: 2023-03-10

## 2023-03-13 ENCOUNTER — APPOINTMENT (OUTPATIENT)
Dept: GENERAL RADIOLOGY | Age: 38
End: 2023-03-13
Payer: MEDICAID

## 2023-03-13 ENCOUNTER — OFFICE VISIT (OUTPATIENT)
Dept: ORTHOPEDIC SURGERY | Age: 38
End: 2023-03-13

## 2023-03-13 ENCOUNTER — HOSPITAL ENCOUNTER (OUTPATIENT)
Dept: PHYSICAL THERAPY | Age: 38
Setting detail: THERAPIES SERIES
Discharge: HOME | End: 2023-03-13

## 2023-03-13 ENCOUNTER — HOSPITAL ENCOUNTER (EMERGENCY)
Age: 38
Discharge: HOME | End: 2023-03-13
Attending: EMERGENCY MEDICINE
Payer: MEDICAID

## 2023-03-13 VITALS
TEMPERATURE: 98.2 F | OXYGEN SATURATION: 100 % | HEIGHT: 71 IN | DIASTOLIC BLOOD PRESSURE: 97 MMHG | BODY MASS INDEX: 39.26 KG/M2 | HEART RATE: 64 BPM | RESPIRATION RATE: 16 BRPM | SYSTOLIC BLOOD PRESSURE: 163 MMHG | WEIGHT: 280.43 LBS

## 2023-03-13 DIAGNOSIS — S52.045A CLOSED NONDISPLACED FRACTURE OF CORONOID PROCESS OF LEFT ULNA, INITIAL ENCOUNTER: Primary | ICD-10-CM

## 2023-03-13 DIAGNOSIS — M25.512 LEFT SHOULDER PAIN, UNSPECIFIED CHRONICITY: ICD-10-CM
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
## 2023-03-13 DIAGNOSIS — M25.512 BILATERAL SHOULDER PAIN, UNSPECIFIED CHRONICITY: ICD-10-CM

## 2023-03-13 DIAGNOSIS — Z98.890 S/P SHOULDER SURGERY: Primary | ICD-10-CM
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
## 2023-03-13 DIAGNOSIS — S80.12XA HEMATOMA OF LEFT LOWER LEG: ICD-10-CM

## 2023-03-13 DIAGNOSIS — M25.511 BILATERAL SHOULDER PAIN, UNSPECIFIED CHRONICITY: ICD-10-CM

## 2023-03-13 DIAGNOSIS — Z98.890 STATUS POST LABRAL REPAIR OF SHOULDER: ICD-10-CM

## 2023-03-13 PROCEDURE — 73630 X-RAY EXAM OF FOOT: CPT

## 2023-03-13 PROCEDURE — 73590 X-RAY EXAM OF LOWER LEG: CPT

## 2023-03-13 PROCEDURE — 73610 X-RAY EXAM OF ANKLE: CPT

## 2023-03-13 PROCEDURE — 6370000000 HC RX 637 (ALT 250 FOR IP): Performed by: EMERGENCY MEDICINE

## 2023-03-13 PROCEDURE — 73030 X-RAY EXAM OF SHOULDER: CPT

## 2023-03-13 PROCEDURE — 29125 APPL SHORT ARM SPLINT STATIC: CPT

## 2023-03-13 PROCEDURE — 73080 X-RAY EXAM OF ELBOW: CPT

## 2023-03-13 PROCEDURE — 99283 EMERGENCY DEPT VISIT LOW MDM: CPT

## 2023-03-13 PROCEDURE — 99024 POSTOP FOLLOW-UP VISIT: CPT | Performed by: PHYSICIAN ASSISTANT

## 2023-03-13 RX ORDER — OXYCODONE HYDROCHLORIDE AND ACETAMINOPHEN 5; 325 MG/1; MG/1
2 TABLET ORAL ONCE
Status: COMPLETED | OUTPATIENT
Start: 2023-03-13 | End: 2023-03-13

## 2023-03-13 RX ORDER — LIDOCAINE 4 G/G
1 PATCH TOPICAL DAILY
Status: DISCONTINUED | OUTPATIENT
Start: 2023-03-13 | End: 2023-03-13 | Stop reason: HOSPADM

## 2023-03-13 RX ORDER — OXYCODONE HYDROCHLORIDE AND ACETAMINOPHEN 5; 325 MG/1; MG/1
1 TABLET ORAL EVERY 6 HOURS PRN
Qty: 12 TABLET | Refills: 0 | Status: SHIPPED | OUTPATIENT
Start: 2023-03-13 | End: 2023-03-16

## 2023-03-13 RX ADMIN — OXYCODONE HYDROCHLORIDE AND ACETAMINOPHEN 2 TABLET: 5; 325 TABLET ORAL at 19:26

## 2023-03-13 ASSESSMENT — PAIN DESCRIPTION - FREQUENCY: FREQUENCY: CONTINUOUS

## 2023-03-13 ASSESSMENT — PAIN DESCRIPTION - DESCRIPTORS: DESCRIPTORS: SHARP

## 2023-03-13 ASSESSMENT — PAIN DESCRIPTION - PAIN TYPE: TYPE: ACUTE PAIN

## 2023-03-13 ASSESSMENT — PAIN - FUNCTIONAL ASSESSMENT: PAIN_FUNCTIONAL_ASSESSMENT: 0-10

## 2023-03-13 ASSESSMENT — PAIN SCALES - GENERAL: PAINLEVEL_OUTOF10: 10

## 2023-03-13 NOTE — LETTER
Physical Therapy Rehabilitation Referral    Patient Name:  Miguel Wiseman      YOB: 1985    Diagnosis:    1. S/P shoulder surgery    2. Status post labral repair of shoulder    3. Left shoulder pain, unspecified chronicity        Precautions:     [x] Evaluate and Treat    Post Op Instructions:  [] Continuous passive motion (CPM) [] Elbow ROM  [x] Exercise in plane of scapula  []  Strengthening     [x] Pulley and instruction   [x] Home exercise program (copy to patient)   [] Sling when arm at risk  [] Sling or brace at all times   [x] AAROM: Forward elevation to  140            [x] AAROM: External rotation  To  40    [] Isometric external rotator strengthening [x] AAROM: internal rotation: up the back  [x] Isometric abductor strengthening  [x] AAROM: Internal abduction   [] Isometric internal rotator strengthening [x] AAROM: cross-body adduction             Stretching:     Strengthening:  [x] Four quadrant (FE, ER, IR, CBA)  [x] Rotator cuff (ER, IR, Abd)  [] Forward Elevation    [] External Rotators     [] External Rotation    [] Internal Rotators  [] Internal Rotation: up/back   [] Abductors     [] Internal Rotation: supine in abduction  [] Sleeper Stretch    [] Flexors  [] Cross-body abduction    [] Extensors  [] Pendulum (FE, Abd/Add, cw/ccw)  [x] Scapular Stabilizers   [] Wall-walking (FE, Abd)        [x] Shoulder shrugs     [] Table slides (FE)                [x] Rhomboid pinch  [] Elbow (flex, ext, pron, sup)        [] Lat.  Pull downs     [] Medial epicondylitis program       [] Forward punch   [] Lateral epicondylitis program       [] Internal rotators     [] Progressive resistive exercises  [] Bench Press        [] Bench press plus  Activities:     [] Lateral pull-downs  [] Rowing     [x] Progressive two-hand supine press  [] Stepper/Exercise bike   [x] Biceps: curls/supination  [] Swimming  [] Water exercises    Modalities:     Return to Sport:  [x] Of Choice      [] Plyometrics  [] Ultrasound     [] Rhythmic stabilization  [] Iontophoresis    [] Core strengthening   [] Moist heat     [] Sports specific program:   [] Massage         [x] Cryotherapy      [] Electrical stimulation     [] Paraffin  [] Whirlpool  [] TENS    [x] Home exercise program (copy to patient). Perform exercises for:   15     minutes    3      times/day  [x] Supervised physical therapy  Frequency: []  1x week  [x] 2x week  [] 3x week  [] Other:   Duration: [] 2 weeks   [] 4 weeks  [x] 6 weeks  [] Other:     Additional Instructions: Please work on rotator cuff strengthening with his left shoulder    Samer EMMETT Bacon MD, PhD

## 2023-03-13 NOTE — ED PROVIDER NOTES
[address] [facility]          ATTENDING PHYSICIAN NOTE       Date of evaluation: 3/13/2023    Chief Complaint     Fall and Medication Refill (Pt fell on Sun c/o left lower leg ankle and foot pain also had recent rotator cuff surgery and out of pain meds had to reschedule his appt for today)      History of Present Illness     Belle Matos is a 41 y.o. male who presents to the emergency department with complaints of varying sources of pain after a fall yesterday. The patient states that he tripped going down the stairs, and fell part way down the stairs. He denies head trauma or loss of consciousness, but states that in the course of the fall he struck his left anterior shin, and also struck his left elbow and shoulder. He has had significant pain and swelling in the left lower leg into the ankle and foot since that time, although the swelling in the foot has begun to improve. He also states that he has had bruising below the left elbow, with some pain on range of motion of the elbow, and pain with movement of the left shoulder. He also states that he has had worsened pain in the right shoulder, which he had arthroscopic surgery on in January of this year. Review of Systems     Review of Systems   All other systems reviewed and are negative. Past Medical, Surgical, Family, and Social History     He has a past medical history of Asthma, Kidney stone, Manic depression ([address] and Meningitis. He has a past surgical history that includes fracture surgery (Right) and shoulder surgery (Right, 1/16/2023). His family history is not on file. He reports that he has been smoking cigarettes. He has a 1.50 pack-year smoking history. He has never used smokeless tobacco. He reports that he does not drink alcohol and does not use drugs.     Medications     Previous Medications    ACETAMINOPHEN (TYLENOL) 500 MG TABLET    Take 1 tablet by mouth every 6-8 hours as needed for Pain ALBUTEROL SULFATE HFA (VENTOLIN HFA) 108 (90 BASE) MCG/ACT INHALER    Inhale 2 puffs into the lungs 4 times daily as needed for Wheezing    IBUPROFEN (IBU) 800 MG TABLET    Take 1 tablet by mouth every 8 hours as needed for Pain    ONDANSETRON (ZOFRAN) 4 MG TABLET    Take 1 tablet by mouth every 8 hours as needed for Nausea or Vomiting       Allergies     He is allergic to sulfa antibiotics. Physical Exam     INITIAL VITALS: BP: (!) 188/112, Temp: 98.2 °F (36.8 °C), Heart Rate: 80, Resp: 17, SpO2: 100 %     General: Well appearing. Uncomfortable appearing, but pleasantly conversational, and in NAD. HEENT: Head is atraumatic, normocephalic. Pupils are equal, round, and reactive to light. Extraocular muscles are intact. Conjunctivae are clear and moist. No redness or drainage from the eyes. No drainage from the nose. The oropharynx appeared to be normal.    Neck: Supple, with full range of motion. No midline C-spine tenderness to palpation, crepitus, or step-offs. Back: No midline T or L spine tenderness to palpation, crepitus, or step-offs. Chest: Not tender to palpation. Cardiovascular: 2+ radial pulses bilaterally. 2+ DP pulses bilaterally. Respiratory: Unlabored breathing with equal chest rise and fall. Abdomen: Soft and nontender, without guarding or rebound tenderness. No masses or hepatosplenomegaly. Skin: Warm and dry. Neuro: Alert and oriented x3. No focal neurologic deficits are noted. Extremities: Warm and well-perfused. The patient moves all extremities equally. On focused examination of the right upper extremity, the patient has well-healed surgical sites over the anterior aspect of the shoulder. He has diffuse discomfort to palpation, without effusion. He has some pain with abduction above 90 degrees. No pain or tenderness over the elbow, forearm, wrist, or hand on the right.   On focused examination of the left upper extremity, he has some diffuse discomfort to palpation about the left shoulder, without deformity or effusion. He has some pain with abduction above 90 degrees. On examination of the left elbow, there is tenderness to palpation over the olecranon process, with some bruising over the ulnar aspect of the proximal forearm, just distal to the elbow. There is no tenderness to palpation or evidence of injury over the remainder of the forearm, wrist, or hand. On focused examination of the left lower extremity, the patient has a small abrasion with underlying palpable hematoma and some surrounding mild edema over the distal anterior shin. He has some diffuse discomfort to palpation into the ankle and foot, but no focal tenderness to palpation, or edema, which he states was previously present. On focused examination of the right lower extremity, there is no tenderness to palpation, pain on range of motion, or other evidence of injury. Psych: The patient's mood and affect are generally within normal limits for their presentation. Diagnostic Results       RADIOLOGY:  XR SHOULDER RIGHT (MIN 2 VIEWS)   Final Result   Impression:   No acute osseous injury. XR SHOULDER LEFT (MIN 2 VIEWS)   Final Result   Impression:   No acute osseous injury. XR ELBOW LEFT (MIN 3 VIEWS)   Final Result   Impression:   Suspected nondisplaced coronoid process fracture. XR FOOT LEFT (MIN 3 VIEWS)   Final Result      Left le views demonstrate no abnormality. Left ankle: 3 views demonstrate no abnormality. Left foot: 3 views demonstrate minimal osteoarthritic changes at the first MTP joint. No visualized fracture. XR ANKLE LEFT (MIN 3 VIEWS)   Final Result      Left le views demonstrate no abnormality. Left ankle: 3 views demonstrate no abnormality. Left foot: 3 views demonstrate minimal osteoarthritic changes at the first MTP joint. No visualized fracture.       XR TIBIA FIBULA LEFT (2 VIEWS)   Final Result      Left le views demonstrate no abnormality. Left ankle: 3 views demonstrate no abnormality. Left foot: 3 views demonstrate minimal osteoarthritic changes at the first MTP joint. No visualized fracture. LABS:   No results found for this visit on 03/13/23. RECENT VITALS:  BP: (!) 163/97, Temp: 98.2 °F (36.8 °C), Heart Rate: 64, Resp: 16, SpO2: 100 %     Procedures         ED Course     Nursing Notes, Past Medical Hx, Past Surgical Hx, Social Hx, Allergies, and Family Hx were reviewed. The patient was given the following medications:  Orders Placed This Encounter   Medications    lidocaine 4 % external patch 1 patch    oxyCODONE-acetaminophen (PERCOCET) 5-325 MG per tablet 2 tablet    oxyCODONE-acetaminophen (PERCOCET) 5-325 MG per tablet     Sig: Take 1 tablet by mouth every 6 hours as needed for Pain for up to 3 days. Intended supply: 3 days. Take lowest dose possible to manage pain Max Daily Amount: 4 tablets     Dispense:  12 tablet     Refill:  0       CONSULTS:  None    MEDICAL DECISION MAKING / ASSESSMENT / Azeem Helio is a 41 y.o. male with recent right shoulder surgery in January of this year, who presents with exacerbations of previous pain as well as several areas of new pain after a reported fall on the stairs yesterday. He was seen by nursing in triage, and plain films were performed of the left tib-fib, left ankle, and left foot, none of which showed any acute bony injury. His pain in that area appears to be related to a left shin hematoma. Foot is neurovascularly intact. However, at the time of my examination, the patient also complained of significant pain in his bilateral shoulders and his left elbow, with pain on range of motion, and with some bruising noted, particularly just distal to the left elbow.   He was given a Lidoderm patch for his left shin hematoma, and was given 2 tabs of Percocet for pain, and additional plain films were ordered of the shoulders and elbow. As noted above, shoulder films showed no evidence of acute bony abnormality, but the elbow film does show a possible nondisplaced coronoid process fracture, which would fit with the area of the patient's pain. For this reason, a long-arm splint was placed with the elbow in 90 degrees of flexion, and the patient is referred to follow-up with his orthopedic surgeon. I did perform an examination after placement of the splint, and the patient's left hand is neurovascularly intact, with brisk capillary refill, and no sensory changes. Given the findings for new fracture, the patient was given a 3-day prescription for Percocet for pain control at discharge. Medical Decision Making  Problems Addressed:  Bilateral shoulder pain, unspecified chronicity: chronic illness or injury with exacerbation, progression, or side effects of treatment  Closed nondisplaced fracture of coronoid process of left ulna, initial encounter: acute illness or injury  Fall in home, initial encounter: complicated acute illness or injury  Hematoma of left lower leg: acute illness or injury    Amount and/or Complexity of Data Reviewed  External Data Reviewed: notes. Details: Outpatient orthopedic notes  Radiology: ordered. Decision-making details documented in ED Course. Risk  OTC drugs. Prescription drug management. Clinical Impression     1. Closed nondisplaced fracture of coronoid process of left ulna, initial encounter    2. Hematoma of left lower leg    3. Bilateral shoulder pain, unspecified chronicity    4. Fall in home, initial encounter        Disposition     PATIENT REFERRED TO:  Mckayla Flynn MD  [address]  [address] [facility] [address]  [phone]    Call in 1 day  To discuss your ER visit, and arrange a follow-up appointment    DISCHARGE MEDICATIONS:  New Prescriptions    OXYCODONE-ACETAMINOPHEN (PERCOCET) 5-325 MG PER TABLET    Take 1 tablet by mouth every 6 hours as needed for Pain for up to 3 days. Intended supply: 3 days. Take lowest dose possible to manage pain Max Daily Amount: 4 tablets       DISPOSITION Decision To Discharge    (Please note that portions of this note were completed with voice recognition software.   Efforts were made to edit the dictations but occasionally words are mis-transcribed.)     Dorita Marcano MD  03/13/23 2740

## 2023-03-13 NOTE — FLOWSHEET NOTE
The [facility] and [address],  Sports Performance and Rehabilitation, [facility] [address]  [address] [facility],5[address]   Letty Bentley  Phone: [phone]  Fax: [phone]      Physical Therapy  Cancellation/No-show Note  Patient Name:  Dave Hou  :  1985   Date:  3/13/2023  Cancelled visits to date: 1 (Evaluation)  No-shows to date: 3 (Evaluation x 3)    For today's appointment patient:  [x]  Cancelled  []  Rescheduled appointment  []  No-show     Reason given by patient:  []  Patient ill  []  Conflicting appointment   []  No transportation    []  Conflict with work  []  No reason given  [x]  Other:     Comments:  Patient was a walk over after MD follow up visit and was to be seen for PT evaluation. Patient filled out his intake paperwork and then stated he needed to cancel the appointment and leave due to an emergency. Stated he would call back to reschedule his PT visit.       Electronically signed by:  Adrianna Guerrero PT, OMT-C

## 2023-03-13 NOTE — PROGRESS NOTES
History of Present Illness:  Hanny Rowley is a 41 y.o. male who presents for a post operative visit. The patient underwent a right arthroscopic release of adhesions, repair of a bony Bankart lesion with double row anchor repair and anterior labral repair on 1/16/2023. He has not been seen in our office since his first postop visit. The patient reports he has some transportation problem and has not been able to come to his office visits as well as with physical therapy. Patient also reports that he has not been able to return back to work and would like to try going back to work. The patient reports that he is also not able to pay the co-pays for physical therapy due to his financial situation. Patient also reports that he is having a lot of pain and would like to have a refill on his pain medications. The patient deny fevers, chills, numbness, tingling, and shortness of breath. Of note the patient is also complaining of left shoulder pain. He denies any specific injuries to his left shoulders. He  Denies any numbness or tingling. He reports he has been using his left shoulder for everything since the right shoulder has not been functioning well. Medical History:  Patient's medications, allergies, past medical, surgical, social and family histories were reviewed and updated as appropriate. No notes on file    Review of Systems  A 14 point review of systems was completed by the patient is available in the media section of the scanned medical record and was reviewed on 3/13/2023. Vital Signs: There were no vitals filed for this visit. General/Appearance: Alert and oriented and in no apparent distress. Skin:  There are no skin lesions, cellulitis, or extreme edema. The patient has warm and well-perfused Bilateral upper extremities with brisk capillary refill.      right Shoulder Exam:    Inspection: right shoulder incision that is clean, dry and intact and well approximated. There is no erythema, drainage or other signs of infection    Palpation:  No crepitus to gentle motion    Active Range of Motion: Forward elevation of 120 and external rotation of 15 degrees    Passive Range of Motion: Forward elevation 130 degrees    Strength:  Deferred    Special Tests:  Deferred. Neurovascular: Sensation to light touch is intact, no motor deficits, palpable radial pulses 2+    Additional Examinations:    Examination of the contralateral extremity does not show any tenderness, deformity or injury. Range of motion is unremarkable. There is no gross instability. There are no rashes, ulcerations or lesions. Strength and tone are normal.  He has mild tenderness over the rotator cuff footprint. External and internal rotation with resistance is 5/5, negative champagne toast test and negative La's. Radiology:     Plain radiographs not obtained today. Assessment :  Mr. Abhishek Childs is a 41 y.o. patient underwent a right arthroscopic release of adhesions, repair of a bony Bankart lesion with double row anchor repair and anterior labral repair on 1/16/2023. Impression:  No diagnosis found. Office Procedures:  No orders of the defined types were placed in this encounter. Treatment Plan: We had a lengthy discussion with the patient regarding his lack of formal physical therapy and his multiple canceled appointments. Patient was told that it is imperative that he at least meet with a physical therapist once a week if not once every other week. The patient was also told that he may start to take oral NSAIDs for pain control. The patient became extremely aggressive in the office and demanded pain medications be sent to his pharmacy after he was denied. Patient also demanded a letter releasing him back to work full-time without restrictions.   The patient was informed that we can have him return back to work full-time with lifting restrictions since he is only 8 weeks following a labrum repair. The patient did become extremely upset. He was then informed that I would discuss his work restrictions with Dr. Richmond Joshua and would let him know what the final decision would be. In regards to his left shoulder with the patient was told that it most likely is related to overuse. We would have him work with physical therapy to work on strength on the left side just as he is working on strength on the right side as well. We will plan to get x-rays at his next visit. The patient was also asked to meet with a physical therapist today since he has not met with them since his surgery. Woody Akers, physical therapist at the [facility] informed me that the patient did come over to meet with him but unfortunately the patient left AGAINST MEDICAL ADVICE without seeing the physical therapist.    All of his questions were fully answered today. We would like to see him back in 4 weeks for follow-up visit. 3:34 PM    Mitchell Botello PA-C  Orthopaedic Sports Medicine Physician Assistant      This dictation was performed with a verbal recognition program [facility]) and it was checked for errors. It is possible that there are still dictated errors within this office note. If so, please bring any errors to my attention for an addendum. All efforts were made to ensure that this office note is accurate.

## 2023-03-14 ENCOUNTER — TELEPHONE (OUTPATIENT)
Dept: ORTHOPEDIC SURGERY | Age: 38
End: 2023-03-14

## 2023-03-14 NOTE — DISCHARGE INSTRUCTIONS
Rest, ice, and elevate the arm as much as possible for the next 48 hours, to minimize swelling. Keep the splint that was placed today clean and dry until you follow-up with orthopedic surgery. Please call your orthopedic surgeon to make a follow-up appointment. You may take Percocet as prescribed for pain, but use caution, as this medication can cause sedation, and you should not drink alcohol, drive, or operate machinery while taking this medication. Please call your doctor's office, or return to the emergency department, if you have increasing pain, increasing swelling, tingling of the fingertips, a blue or white color of the fingertips, or other concerning symptoms. Rest:  Rest and protect the injured area. Ice:  Apply ice or a frozen object, such as a bag of corn, etc from the freezer, to the injury. The cold will reduce swelling and pain at the injured site. This step should be done as soon as possible. Apply the frozen object to the area for 20 minutes 5-6 times a day for the first 48 hours. Do not use heat in the first 48 hours after an injury    Elevation:  Elevate the arm above the level of your heart (a few pillows) when at rest to reduce pain and swelling.

## 2023-03-15 ENCOUNTER — OFFICE VISIT (OUTPATIENT)
Dept: ORTHOPEDIC SURGERY | Age: 38
End: 2023-03-15
Payer: MEDICAID

## 2023-03-15 VITALS — WEIGHT: 280 LBS | BODY MASS INDEX: 39.2 KG/M2 | HEIGHT: 71 IN

## 2023-03-15 DIAGNOSIS — M25.512 LEFT SHOULDER PAIN, UNSPECIFIED CHRONICITY: ICD-10-CM

## 2023-03-15 DIAGNOSIS — Z98.890 S/P SHOULDER SURGERY: Primary | ICD-10-CM

## 2023-03-15 PROCEDURE — 99213 OFFICE O/P EST LOW 20 MIN: CPT | Performed by: ORTHOPAEDIC SURGERY

## 2023-03-15 PROCEDURE — G8484 FLU IMMUNIZE NO ADMIN: HCPCS | Performed by: ORTHOPAEDIC SURGERY

## 2023-03-15 PROCEDURE — 4004F PT TOBACCO SCREEN RCVD TLK: CPT | Performed by: ORTHOPAEDIC SURGERY

## 2023-03-15 PROCEDURE — G8417 CALC BMI ABV UP PARAM F/U: HCPCS | Performed by: ORTHOPAEDIC SURGERY

## 2023-03-15 PROCEDURE — 20610 DRAIN/INJ JOINT/BURSA W/O US: CPT | Performed by: ORTHOPAEDIC SURGERY

## 2023-03-15 PROCEDURE — G8427 DOCREV CUR MEDS BY ELIG CLIN: HCPCS | Performed by: ORTHOPAEDIC SURGERY

## 2023-03-15 RX ORDER — METHYLPREDNISOLONE ACETATE 40 MG/ML
80 INJECTION, SUSPENSION INTRA-ARTICULAR; INTRALESIONAL; INTRAMUSCULAR; SOFT TISSUE ONCE
Status: COMPLETED | OUTPATIENT
Start: 2023-03-15 | End: 2023-03-15

## 2023-03-15 RX ORDER — OXYCODONE HYDROCHLORIDE 5 MG/1
TABLET ORAL
COMMUNITY
Start: 2023-02-18

## 2023-03-15 RX ORDER — TRAMADOL HYDROCHLORIDE 50 MG/1
50 TABLET ORAL EVERY 6 HOURS PRN
Qty: 28 TABLET | Refills: 0 | Status: SHIPPED | OUTPATIENT
Start: 2023-03-15 | End: 2023-03-22

## 2023-03-15 RX ORDER — LIDOCAINE HYDROCHLORIDE 10 MG/ML
8 INJECTION, SOLUTION INFILTRATION; PERINEURAL ONCE
Status: COMPLETED | OUTPATIENT
Start: 2023-03-15 | End: 2023-03-15

## 2023-03-15 RX ADMIN — LIDOCAINE HYDROCHLORIDE 8 ML: 10 INJECTION, SOLUTION INFILTRATION; PERINEURAL at 17:34

## 2023-03-15 RX ADMIN — METHYLPREDNISOLONE ACETATE 80 MG: 40 INJECTION, SUSPENSION INTRA-ARTICULAR; INTRALESIONAL; INTRAMUSCULAR; SOFT TISSUE at 17:35

## 2023-03-15 NOTE — LETTER
March 15, 2023       Christiano YOB: 1985   [address] [place] [address] Date of Visit:  3/15/2023       To Whom It May Concern: It is my medical opinion that [address] [place] [address] may return to full duty immediately with no restrictions. If you have any questions or concerns, please don't hesitate to call.     Sincerely,          Swathi Teresa MD

## 2023-03-20 NOTE — PROGRESS NOTES
History of Present Illness:  Debora Welch returns for follow-up of his right shoulder and also for new onset acute left shoulder and arm pain. He is now 2 months out following arthroscopic stabilization of his right shoulder glenoid rim fracture. He wore a sling for a bit and then self-discontinued. He has not been to PT despite multiple requests. He reports that he can't afford this. He asks to return back to work. He denies any recurrent instability episodes with the right arm. He also has issues with his left shoulder and arm. He has been having more pain since his right shoulder surgery. Additionally, he apparently fell down some steps on 3/13/23 and complained of left elbow and shoulder pain. He was seen in a local ED. Plain radiographs of the left shoulder showed no dislocation but radiographs of the left elbow suggested a nondisplaced coronoid fracture. He has requested pain medications for both shoulders. The ED staff were concerned that he presented to the ED seeking pain medications. They have recommended a referral to pain management. Medical History:  Patient's medications, allergies, past medical, surgical, social and family histories were reviewed and updated as appropriate. Pertinent items are noted in HPI  Review of systems reviewed from Patient History Form dated on 1/16/23 and available in the patient's chart under the Media tab. Vital Signs: There were no vitals filed for this visit. Constitutional: in no distress. No sling. Right Shoulder Examination:    Inspection:  [facility]    Palpation:  slight tenderness. Active Range of Motion: AFE to 70 and ER to 20    Passive Range of Motion:   further progression of AFE to 120 with pain. No mechanical symptoms. Strength:  Negative drop arm. Negative lag. Special Tests:  No apprehension at 60 degrees of ER in abduction. However, he complains of pain and tightness. Left shoulder examination:  NO deformity.

## 2024-04-08 ENCOUNTER — HOSPITAL ENCOUNTER (EMERGENCY)
Age: 39
Discharge: HOME | End: 2024-04-08
Attending: EMERGENCY MEDICINE

## 2024-04-08 VITALS
BODY MASS INDEX: 34.91 KG/M2 | WEIGHT: 243.83 LBS | DIASTOLIC BLOOD PRESSURE: 90 MMHG | SYSTOLIC BLOOD PRESSURE: 176 MMHG | RESPIRATION RATE: 16 BRPM | TEMPERATURE: 98.4 F | HEIGHT: 70 IN | HEART RATE: 65 BPM | OXYGEN SATURATION: 98 %

## 2024-04-08 DIAGNOSIS — H10.9 BACTERIAL CONJUNCTIVITIS: Primary | ICD-10-CM

## 2024-04-08 DIAGNOSIS — K08.89 PAIN, DENTAL: ICD-10-CM

## 2024-04-08 PROCEDURE — 99283 EMERGENCY DEPT VISIT LOW MDM: CPT

## 2024-04-08 PROCEDURE — 6370000000 HC RX 637 (ALT 250 FOR IP): Performed by: PHYSICIAN ASSISTANT

## 2024-04-08 RX ORDER — ERYTHROMYCIN 5 MG/G
OINTMENT OPHTHALMIC ONCE
Status: COMPLETED | OUTPATIENT
Start: 2024-04-08 | End: 2024-04-08

## 2024-04-08 RX ORDER — PENICILLIN V POTASSIUM 500 MG/1
500 TABLET ORAL 4 TIMES DAILY
Qty: 28 TABLET | Refills: 0 | Status: SHIPPED | OUTPATIENT
Start: 2024-04-08 | End: 2024-04-15

## 2024-04-08 RX ORDER — ACETAMINOPHEN 500 MG
1000 TABLET ORAL
Status: COMPLETED | OUTPATIENT
Start: 2024-04-08 | End: 2024-04-08

## 2024-04-08 RX ORDER — IBUPROFEN 800 MG/1
800 TABLET ORAL EVERY 8 HOURS PRN
Qty: 30 TABLET | Refills: 0 | Status: SHIPPED | OUTPATIENT
Start: 2024-04-08

## 2024-04-08 RX ORDER — IBUPROFEN 400 MG/1
800 TABLET ORAL
Status: COMPLETED | OUTPATIENT
Start: 2024-04-08 | End: 2024-04-08

## 2024-04-08 RX ORDER — ACETAMINOPHEN 325 MG/1
650 TABLET ORAL EVERY 6 HOURS PRN
Qty: 60 TABLET | Refills: 0 | Status: SHIPPED | OUTPATIENT
Start: 2024-04-08

## 2024-04-08 RX ORDER — TETRACAINE HYDROCHLORIDE 5 MG/ML
1 SOLUTION OPHTHALMIC ONCE
Status: COMPLETED | OUTPATIENT
Start: 2024-04-08 | End: 2024-04-08

## 2024-04-08 RX ORDER — ERYTHROMYCIN 5 MG/G
OINTMENT OPHTHALMIC
Qty: 1 G | Refills: 0 | Status: SHIPPED | OUTPATIENT
Start: 2024-04-08 | End: 2024-04-18

## 2024-04-08 RX ADMIN — FLUORESCEIN SODIUM 1 MG: 1 STRIP OPHTHALMIC at 19:08

## 2024-04-08 RX ADMIN — ERYTHROMYCIN: 5 OINTMENT OPHTHALMIC at 19:08

## 2024-04-08 RX ADMIN — IBUPROFEN 800 MG: 400 TABLET, FILM COATED ORAL at 18:06

## 2024-04-08 RX ADMIN — ACETAMINOPHEN 1000 MG: 500 TABLET ORAL at 18:06

## 2024-04-08 RX ADMIN — TETRACAINE HYDROCHLORIDE 1 DROP: 5 SOLUTION OPHTHALMIC at 19:08

## 2024-04-08 ASSESSMENT — LIFESTYLE VARIABLES
HOW MANY STANDARD DRINKS CONTAINING ALCOHOL DO YOU HAVE ON A TYPICAL DAY: 1 OR 2
HOW OFTEN DO YOU HAVE A DRINK CONTAINING ALCOHOL: 2-4 TIMES A MONTH
HOW OFTEN DO YOU HAVE A DRINK CONTAINING ALCOHOL: 2-4 TIMES A MONTH

## 2024-04-08 ASSESSMENT — PAIN DESCRIPTION - DESCRIPTORS: DESCRIPTORS: DISCOMFORT

## 2024-04-08 ASSESSMENT — PAIN SCALES - GENERAL: PAINLEVEL_OUTOF10: 8

## 2024-04-08 NOTE — DISCHARGE INSTRUCTIONS
Take the antibiotic pill for your dental pain. Alternate ibuprofen and tylenol for further pain control. Follow-up with your dentist.    Use the erythromycin ointment in both of your eyes as prescribed 4-5 times daily. Please schedule a follow-up appointment with [facility] for re-evaluation. If you have no improvement, come back to the ED for re-evaluation.

## 2024-04-08 NOTE — ED PROVIDER NOTES
ED Attending Attestation Note     Date of evaluation: 4/8/2024    This patient was seen by the advance practice provider.  I have seen and examined the patient, agree with the workup, evaluation, management and diagnosis. The care plan has been discussed.  My assessment reveals 39-year-old male with left greater than right but bilateral eye pain.  On examination he has mild conjunctival injection with some purulent/greenish discharge from the left eye with a little bit of lid edema.  Pupils equal round reactive to light.  There is no gross hyphema or hypopyon.  No injury. Not a contact lens wearer. History and exam suggestive of bacterial conjunctivitis.       Madhu Marie MD  04/08/24 8619    
has a past surgical history that includes fracture surgery (Right) and shoulder surgery (Right, 1/16/2023).  His family history is not on file.  He reports that he has been smoking cigarettes. He has a 1.5 pack-year smoking history. He has never used smokeless tobacco. He reports current alcohol use. He reports that he does not use drugs.    Medications     Discharge Medication List as of 4/8/2024  7:10 PM        CONTINUE these medications which have NOT CHANGED    Details   oxyCODONE (ROXICODONE) 5 MG immediate release tablet TAKE 1 TABLET BY MOUTH EVERY 6 HOURS FOR UP TO 5 DAYS AS NEEDED FOR SEVERE PAINHistorical Med      ondansetron (ZOFRAN) 4 MG tablet Take 1 tablet by mouth every 8 hours as needed for Nausea or Vomiting, Disp-15 tablet, R-0Normal      albuterol sulfate HFA (VENTOLIN HFA) 108 (90 Base) MCG/ACT inhaler Inhale 2 puffs into the lungs 4 times daily as needed for Wheezing, Disp-18 g, R-1Print             Allergies     He is allergic to sulfa antibiotics.    Physical Exam     INITIAL VITALS: BP: (!) 195/77, Temp: 98.4 °F (36.9 °C), Pulse: 70, Respirations: 16, SpO2: 98 %  Physical Exam  Vitals and nursing note reviewed.   Constitutional:       General: He is not in acute distress.     Appearance: He is well-developed. He is not diaphoretic.   HENT:      Head: Normocephalic and atraumatic.      Mouth/Throat:      Comments: Right maxillary molar chipped, slightly tender to palpation. No periapical abscess. No facial swelling  Eyes:      Conjunctiva/sclera: Conjunctivae normal.      Pupils: Pupils are equal, round, and reactive to light.      Comments: Bilateral upper eyelids slightly swollen, left greater than right.  Conjunctival injection noted to the bilateral eyes.  Pupils equal and reactive to light.  Visual acuity grossly intact.  Purulent discharge noted to the left eye.  Fluorescein examination without any uptake, negative Ruthie sign   Cardiovascular:      Rate and Rhythm: Normal rate and

## 2024-04-09 ASSESSMENT — ENCOUNTER SYMPTOMS
SHORTNESS OF BREATH: 0
NAUSEA: 0
COUGH: 0
VOMITING: 0
ABDOMINAL PAIN: 0
CHEST TIGHTNESS: 0
WHEEZING: 0
EYE DISCHARGE: 1
DIARRHEA: 0
EYE REDNESS: 1

## 2024-06-04 ENCOUNTER — HOSPITAL ENCOUNTER (EMERGENCY)
Age: 39
Discharge: HOME | End: 2024-06-04
Attending: EMERGENCY MEDICINE

## 2024-06-04 VITALS
OXYGEN SATURATION: 99 % | RESPIRATION RATE: 18 BRPM | TEMPERATURE: 98.8 F | WEIGHT: 245 LBS | BODY MASS INDEX: 35.15 KG/M2 | SYSTOLIC BLOOD PRESSURE: 175 MMHG | HEART RATE: 79 BPM | DIASTOLIC BLOOD PRESSURE: 92 MMHG

## 2024-06-04 DIAGNOSIS — L02.91 ABSCESS OF MULTIPLE SITES: ICD-10-CM

## 2024-06-04 DIAGNOSIS — J02.9 ACUTE PHARYNGITIS, UNSPECIFIED ETIOLOGY: Primary | ICD-10-CM

## 2024-06-04 LAB
ANION GAP SERPL CALCULATED.3IONS-SCNC: 10 MMOL/L (ref 3–16)
BACTERIA URNS QL MICRO: ABNORMAL /HPF
BASOPHILS # BLD: 0.1 K/UL (ref 0–0.2)
BASOPHILS NFR BLD: 0.4 %
BILIRUB UR QL STRIP.AUTO: NEGATIVE
BUN SERPL-MCNC: 11 MG/DL (ref 7–20)
CALCIUM SERPL-MCNC: 9 MG/DL (ref 8.3–10.6)
CHLORIDE SERPL-SCNC: 99 MMOL/L (ref 99–110)
CLARITY UR: CLEAR
CO2 SERPL-SCNC: 27 MMOL/L (ref 21–32)
COLOR UR: YELLOW
CREAT SERPL-MCNC: 0.8 MG/DL (ref 0.9–1.3)
DEPRECATED RDW RBC AUTO: 13.6 % (ref 12.4–15.4)
EOSINOPHIL # BLD: 0.3 K/UL (ref 0–0.6)
EOSINOPHIL NFR BLD: 1.8 %
EPI CELLS #/AREA URNS AUTO: 1 /HPF (ref 0–5)
FLUAV RNA RESP QL NAA+PROBE: NOT DETECTED
FLUBV RNA RESP QL NAA+PROBE: NOT DETECTED
GFR SERPLBLD CREATININE-BSD FMLA CKD-EPI: >90 ML/MIN/{1.73_M2}
GLUCOSE SERPL-MCNC: 98 MG/DL (ref 70–99)
GLUCOSE UR STRIP.AUTO-MCNC: NEGATIVE MG/DL
HCT VFR BLD AUTO: 40.2 % (ref 40.5–52.5)
HGB BLD-MCNC: 13.7 G/DL (ref 13.5–17.5)
HGB UR QL STRIP.AUTO: ABNORMAL
HYALINE CASTS #/AREA URNS AUTO: 0 /LPF (ref 0–8)
KETONES UR STRIP.AUTO-MCNC: ABNORMAL MG/DL
LACTATE BLDV-SCNC: 0.9 MMOL/L (ref 0.4–1.9)
LEUKOCYTE ESTERASE UR QL STRIP.AUTO: NEGATIVE
LYMPHOCYTES # BLD: 1.7 K/UL (ref 1–5.1)
LYMPHOCYTES NFR BLD: 9.8 %
MCH RBC QN AUTO: 33.8 PG (ref 26–34)
MCHC RBC AUTO-ENTMCNC: 34.2 G/DL (ref 31–36)
MCV RBC AUTO: 98.9 FL (ref 80–100)
MONOCYTES # BLD: 1.3 K/UL (ref 0–1.3)
MONOCYTES NFR BLD: 7.9 %
NEUTROPHILS # BLD: 13.6 K/UL (ref 1.7–7.7)
NEUTROPHILS NFR BLD: 80.1 %
NITRITE UR QL STRIP.AUTO: NEGATIVE
PH UR STRIP.AUTO: 6 [PH] (ref 5–8)
PLATELET # BLD AUTO: 294 K/UL (ref 135–450)
PMV BLD AUTO: 7.4 FL (ref 5–10.5)
POTASSIUM SERPL-SCNC: 4.5 MMOL/L (ref 3.5–5.1)
PROT UR STRIP.AUTO-MCNC: ABNORMAL MG/DL
RBC # BLD AUTO: 4.07 M/UL (ref 4.2–5.9)
RBC CLUMPS #/AREA URNS AUTO: 34 /HPF (ref 0–4)
S PYO AG THROAT QL: NEGATIVE
SARS-COV-2 RNA RESP QL NAA+PROBE: NOT DETECTED
SODIUM SERPL-SCNC: 136 MMOL/L (ref 136–145)
SP GR UR STRIP.AUTO: >=1.03 (ref 1–1.03)
UA COMPLETE W REFLEX CULTURE PNL UR: ABNORMAL
UA DIPSTICK W REFLEX MICRO PNL UR: YES
URN SPEC COLLECT METH UR: ABNORMAL
UROBILINOGEN UR STRIP-ACNC: 2 E.U./DL
WBC # BLD AUTO: 17 K/UL (ref 4–11)
WBC #/AREA URNS AUTO: 1 /HPF (ref 0–5)

## 2024-06-04 PROCEDURE — 87205 SMEAR GRAM STAIN: CPT

## 2024-06-04 PROCEDURE — 99284 EMERGENCY DEPT VISIT MOD MDM: CPT

## 2024-06-04 PROCEDURE — 6360000002 HC RX W HCPCS: Performed by: EMERGENCY MEDICINE

## 2024-06-04 PROCEDURE — 87186 SC STD MICRODIL/AGAR DIL: CPT

## 2024-06-04 PROCEDURE — 87077 CULTURE AEROBIC IDENTIFY: CPT

## 2024-06-04 PROCEDURE — 87491 CHLMYD TRACH DNA AMP PROBE: CPT

## 2024-06-04 PROCEDURE — 80048 BASIC METABOLIC PNL TOTAL CA: CPT

## 2024-06-04 PROCEDURE — 6370000000 HC RX 637 (ALT 250 FOR IP): Performed by: EMERGENCY MEDICINE

## 2024-06-04 PROCEDURE — 83605 ASSAY OF LACTIC ACID: CPT

## 2024-06-04 PROCEDURE — 87880 STREP A ASSAY W/OPTIC: CPT

## 2024-06-04 PROCEDURE — 87070 CULTURE OTHR SPECIMN AEROBIC: CPT

## 2024-06-04 PROCEDURE — 87636 SARSCOV2 & INF A&B AMP PRB: CPT

## 2024-06-04 PROCEDURE — 87591 N.GONORRHOEAE DNA AMP PROB: CPT

## 2024-06-04 PROCEDURE — 81001 URINALYSIS AUTO W/SCOPE: CPT

## 2024-06-04 PROCEDURE — 96372 THER/PROPH/DIAG INJ SC/IM: CPT

## 2024-06-04 PROCEDURE — 85025 COMPLETE CBC W/AUTO DIFF WBC: CPT

## 2024-06-04 PROCEDURE — 36415 COLL VENOUS BLD VENIPUNCTURE: CPT

## 2024-06-04 PROCEDURE — 87081 CULTURE SCREEN ONLY: CPT

## 2024-06-04 RX ORDER — GUAIFENESIN/DEXTROMETHORPHAN 100-10MG/5
5 SYRUP ORAL 3 TIMES DAILY PRN
Qty: 120 ML | Refills: 0 | Status: SHIPPED | OUTPATIENT
Start: 2024-06-04 | End: 2024-06-14

## 2024-06-04 RX ORDER — IBUPROFEN 600 MG/1
600 TABLET ORAL 3 TIMES DAILY PRN
Qty: 30 TABLET | Refills: 0 | Status: SHIPPED | OUTPATIENT
Start: 2024-06-04

## 2024-06-04 RX ORDER — IBUPROFEN 600 MG/1
600 TABLET ORAL ONCE
Status: COMPLETED | OUTPATIENT
Start: 2024-06-04 | End: 2024-06-04

## 2024-06-04 RX ORDER — DOXYCYCLINE HYCLATE 100 MG
100 TABLET ORAL 2 TIMES DAILY
Qty: 20 TABLET | Refills: 0 | Status: SHIPPED | OUTPATIENT
Start: 2024-06-04 | End: 2024-06-14

## 2024-06-04 RX ORDER — DOXYCYCLINE HYCLATE 100 MG
100 TABLET ORAL ONCE
Status: COMPLETED | OUTPATIENT
Start: 2024-06-04 | End: 2024-06-04

## 2024-06-04 RX ORDER — AMOXICILLIN AND CLAVULANATE POTASSIUM 875; 125 MG/1; MG/1
1 TABLET, FILM COATED ORAL 2 TIMES DAILY
Qty: 20 TABLET | Refills: 0 | Status: SHIPPED | OUTPATIENT
Start: 2024-06-04 | End: 2024-06-14

## 2024-06-04 RX ORDER — CEFTRIAXONE 500 MG/1
500 INJECTION, POWDER, FOR SOLUTION INTRAMUSCULAR; INTRAVENOUS ONCE
Status: COMPLETED | OUTPATIENT
Start: 2024-06-04 | End: 2024-06-04

## 2024-06-04 RX ADMIN — IBUPROFEN 600 MG: 600 TABLET ORAL at 20:38

## 2024-06-04 RX ADMIN — DOXYCYCLINE HYCLATE 100 MG: 100 TABLET, COATED ORAL at 22:11

## 2024-06-04 RX ADMIN — CEFTRIAXONE SODIUM 500 MG: 500 INJECTION, POWDER, FOR SOLUTION INTRAMUSCULAR; INTRAVENOUS at 22:11

## 2024-06-04 ASSESSMENT — ENCOUNTER SYMPTOMS
SHORTNESS OF BREATH: 0
GASTROINTESTINAL NEGATIVE: 1
COUGH: 1
ABDOMINAL PAIN: 0
SORE THROAT: 1
BACK PAIN: 0

## 2024-06-04 ASSESSMENT — PAIN DESCRIPTION - LOCATION: LOCATION: THROAT

## 2024-06-04 ASSESSMENT — PAIN SCALES - GENERAL: PAINLEVEL_OUTOF10: 9

## 2024-06-04 ASSESSMENT — PAIN - FUNCTIONAL ASSESSMENT: PAIN_FUNCTIONAL_ASSESSMENT: 0-10

## 2024-06-05 NOTE — ED PROVIDER NOTES
other components within normal limits   URINALYSIS WITH REFLEX TO CULTURE - Abnormal; Notable for the following components:    Ketones, Urine TRACE (*)     Blood, Urine MODERATE (*)     Protein, UA TRACE (*)     Urobilinogen, Urine 2.0 (*)     All other components within normal limits   MICROSCOPIC URINALYSIS - Abnormal; Notable for the following components:    RBC, UA 34 (*)     All other components within normal limits   COVID-19 & INFLUENZA COMBO   STREP SCREEN GROUP A THROAT   CULTURE, WOUND    Narrative:     ORDER#: [id number]                          ORDERED BY: JUVE ALARCON  SOURCE: Skin                               COLLECTED:  06/04/24 20:59  ANTIBIOTICS AT [facility].:                      RECEIVED :  06/04/24 21:41   CULTURE, BETA STREP CONFIRM PLATES   C.TRACHOMATIS N.GONORRHOEAE DNA, URINE   LACTATE, SEPSIS   LACTATE, SEPSIS       When ordered only abnormal lab results are displayed. All other labs were within normal range or not returned as of this dictation.    RADIOLOGY:   Non-plain film images such as CT, Ultrasound and MRI are read by the radiologist. Plain radiographic images are visualized and preliminarily interpreted by the ED Provider with the below findings:    Interpretation per the Radiologist below, if available at the time of this note:    No orders to display     No results found.    No results found.    PROCEDURES   Unless otherwise noted below, none     Procedures    CRITICAL CARE TIME   Total Critical Care time was 0 minutes, excluding separately reportable procedures.  There was a high probability of clinically significant/life threatening deterioration in the patient's condition which required my urgent intervention.       This includes multiple reevaluations, vital sign monitoring, pulse oximetry monitoring, telemetry monitoring, clinical response to the IV medications, reviewing the nursing notes, consultation time, dictation/documentation time, discussions with the patients/family,

## 2024-06-05 NOTE — ED NOTES
6/5/24  Attempted to contact pt re: ED visit 6/4/24; # dialed is not a working number.   Writer spoke with Eicu concerning the dressing changes for the necrotic left last three. finger.  Will monitor patient

## 2024-06-06 LAB
BACTERIA SPEC AEROBE CULT: ABNORMAL
BACTERIA SPEC AEROBE CULT: ABNORMAL
GRAM STN SPEC: ABNORMAL
ORGANISM: ABNORMAL
S PYO THROAT QL CULT: ABNORMAL
S PYO THROAT QL CULT: ABNORMAL

## 2024-06-07 LAB
C TRACH DNA UR QL NAA+PROBE: NEGATIVE
N GONORRHOEA DNA UR QL NAA+PROBE: NEGATIVE

## 2025-02-05 ENCOUNTER — HOSPITAL ENCOUNTER (EMERGENCY)
Age: 40
Discharge: HOME | End: 2025-02-05
Attending: EMERGENCY MEDICINE
Payer: MEDICAID

## 2025-02-05 VITALS
BODY MASS INDEX: 42.38 KG/M2 | TEMPERATURE: 98.6 F | OXYGEN SATURATION: 94 % | HEART RATE: 75 BPM | DIASTOLIC BLOOD PRESSURE: 92 MMHG | HEIGHT: 71 IN | RESPIRATION RATE: 20 BRPM | SYSTOLIC BLOOD PRESSURE: 167 MMHG | WEIGHT: 302.69 LBS

## 2025-02-05 DIAGNOSIS — L02.91 ABSCESS: Primary | ICD-10-CM

## 2025-02-05 PROCEDURE — 6360000002 HC RX W HCPCS: Performed by: PHYSICIAN ASSISTANT

## 2025-02-05 PROCEDURE — 10060 I&D ABSCESS SIMPLE/SINGLE: CPT

## 2025-02-05 PROCEDURE — 99283 EMERGENCY DEPT VISIT LOW MDM: CPT

## 2025-02-05 RX ORDER — OXYCODONE HYDROCHLORIDE 5 MG/1
5 TABLET ORAL ONCE
Status: DISCONTINUED | OUTPATIENT
Start: 2025-02-05 | End: 2025-02-05 | Stop reason: HOSPADM

## 2025-02-05 RX ORDER — LIDOCAINE HYDROCHLORIDE 10 MG/ML
5 INJECTION, SOLUTION INFILTRATION; PERINEURAL ONCE
Status: COMPLETED | OUTPATIENT
Start: 2025-02-05 | End: 2025-02-05

## 2025-02-05 RX ORDER — DOXYCYCLINE HYCLATE 100 MG
100 TABLET ORAL 2 TIMES DAILY
Qty: 14 TABLET | Refills: 0 | Status: SHIPPED | OUTPATIENT
Start: 2025-02-05 | End: 2025-02-12

## 2025-02-05 RX ADMIN — LIDOCAINE HYDROCHLORIDE 5 ML: 10 INJECTION, SOLUTION INFILTRATION; PERINEURAL at 20:05

## 2025-02-05 ASSESSMENT — PAIN DESCRIPTION - LOCATION: LOCATION: BUTTOCKS

## 2025-02-05 ASSESSMENT — PAIN DESCRIPTION - ORIENTATION: ORIENTATION: RIGHT

## 2025-02-05 ASSESSMENT — PAIN DESCRIPTION - DESCRIPTORS: DESCRIPTORS: STABBING;THROBBING

## 2025-02-05 ASSESSMENT — PAIN SCALES - GENERAL: PAINLEVEL_OUTOF10: 8

## 2025-02-05 ASSESSMENT — PAIN - FUNCTIONAL ASSESSMENT: PAIN_FUNCTIONAL_ASSESSMENT: 0-10

## 2025-02-05 NOTE — ED PROVIDER NOTES
ED Attending Attestation Note     Date of evaluation: 2/5/2025    This patient was seen by the advance practice provider.  I have seen and examined the patient, agree with the workup, evaluation, management and diagnosis. The care plan has been discussed.       Patient History     Chief Complaint: Abscess (Pt. Presents to ED with c/o draining abscess to right buttock. )      HPI: Santy Louis is a 40 y.o. who presents to the Emergency Department with complaints of development of a boil over the medial aspect of the right buttock over the last couple of days, which the patient states began after he had removed some hairs in that area.  Patient states that has become increasingly painful, such that he now cannot sit down without pain.  He came to the emergency department for evaluation, and states that while in the waiting room the boil \"busted\" and began to drain.  Denies fevers or chills.  Patient does state that he has had an abscess that required incision and drainage in the past, although it has been some while.  Denies any history of diabetes.    He has a past medical history of Asthma, Kidney stone, Manic depression (HCC), and Meningitis.    He has a past surgical history that includes fracture surgery (Right) and shoulder surgery (Right, 1/16/2023).    family history is not on file.    He reports that he has been smoking cigarettes. He has a 1.5 pack-year smoking history. He has never used smokeless tobacco. He reports current alcohol use. He reports that he does not use drugs.    Pertinent Physical Exam Findings:   On examination of the buttocks, the patient does have a very fluctuant, tender abscess over the medial aspect of the inferior buttock, although remote from the anal verge, and not concerning for a perirectal abscess.  With relatively light pressure to the area, there is profuse drainage of radha purulent material from the abscess.    I had a conversation with the patient regarding options of

## 2025-02-05 NOTE — ED TRIAGE NOTES
[facility] Emergency Department  MEDICAL SCREENING EXAM    Date of Service: 2/5/2025    Reason for Visit: Abscess (Pt. Presents to ED with c/o draining abscess to right buttock. )        Patient History, Brief Exam, and Initial Assessment     Abbreviated HPI: Santy Louis is a 40 y.o. male presenting with draining abscess to the medial aspect of the right buttock, having developed over the course of the last couple days, and with initiation of drainage this afternoon    INITIAL VITALS: BP: (!) 152/99, Temp: 98.6 °F (37 °C), Pulse: 80, Respirations: 18, SpO2: 95 %    Area of painful fluctuance over the medial aspect of the inferior right buttock, actively and copiously draining with minimal pressure.    Plan     Patient was evaluated in the REU for a medical screening exam.  To further evaluate the presenting complaints, the following orders have been placed:  No orders of the defined types were placed in this encounter.       See primary provider's note for full details and final disposition.     Current Facility-Administered Medications:   No orders of the defined types were placed in this encounter.        REU Dispo     Stable for lobby while awaiting ED bed        Relevant Medical History     Past Medical History:   Diagnosis Date    Asthma     Kidney stone     Manic depression (HCC)     Meningitis     April 2011 - treated at [facility]     Past Surgical History:   Procedure Laterality Date    FRACTURE SURGERY Right     knee    SHOULDER SURGERY Right 1/16/2023    RIGHT SHOULDER ARTHROSCOPY, ARTHROSCOPIC EXTENSIVE DEBRIDEMENT, LYSIS OF ADHESIONS, ARTHROSCOPIC REPAIR OF BONY BANKART REPAIR AND ARTHROSCOPIC ANTERIOR LABRAL REPAIR performed by Anuel Almendarez MD at [facility] OR     Allergies   Allergen Reactions    Sulfa Antibiotics Swelling

## 2025-02-05 NOTE — ED PROVIDER NOTES
THE [facility]  EMERGENCY DEPARTMENT ENCOUNTER          PHYSICIAN ASSISTANT NOTE       Date of evaluation: 2/5/2025    Chief Complaint     Abscess (Pt. Presents to ED with c/o draining abscess to right buttock. )      History of Present Illness     Santy Louis is a 40 y.o. male who presents to the [facility] due to concern for abscess.  Patient states over the last several days he has noticed an area of swelling to his right buttock.  He states the area became spontaneously draining while waiting in the lobby today.  He has had similar abscess, however never this area before.  Denies fever, nausea, vomiting, or systemic symptoms.  Denies pain or difficulty with defecation.    ASSESSMENT / PLAN  (MEDICAL DECISION MAKING)     INITIAL VITALS: BP: (!) 152/99, Temp: 98.6 °F (37 °C), Pulse: 80, Respirations: 18, SpO2: 95 %    Santy Louis is a 40 y.o. male who presents to the [facility] due to concern for abscess.  Vital signs stable on presentation remained stable throughout his stay.  Thorough history and physical exam performed.    Patient presents to the emergency room due to concern for abscess.  He states over the last several days he has noticed an area of pain and swelling to his right buttock.  He has had abscesses in the past, however never in this area.  Denies pain with defecation.  Denies fever, nausea, vomiting, systemic symptoms.  The attending physician performed a medical screening exam initially where she had a severe decision making conversation as the abscess is currently draining.  The patient preferred the area be opened up for better drainage.  Please see procedure section for details.  Patient tolerated procedure well.  After incision and drainage was performed, I do feel he is stable to be discharged.  He still had some mild induration surrounding the drainage area, therefore feel it is reasonable to start the patient on antibiotics.  He is allergic to sulfa, therefore was

## 2025-02-06 ASSESSMENT — ENCOUNTER SYMPTOMS
ABDOMINAL PAIN: 0
SHORTNESS OF BREATH: 0
VOMITING: 0
NAUSEA: 0
ROS SKIN COMMENTS: ABSCESS

## 2025-02-06 NOTE — DISCHARGE INSTRUCTIONS
Your abscess was drained in the emergency department today.  You were given a prescription for doxycycline, which is an antibiotic, to take for the next week.  Please follow-up with your primary care provider as an outpatient.  Return to the emergency department for fever, nausea, vomiting, or other concerning symptoms.

## 2025-03-16 ENCOUNTER — HOSPITAL ENCOUNTER (EMERGENCY)
Age: 40
Discharge: HOME | End: 2025-03-16
Attending: EMERGENCY MEDICINE
Payer: MEDICAID

## 2025-03-16 VITALS
BODY MASS INDEX: 43.75 KG/M2 | DIASTOLIC BLOOD PRESSURE: 90 MMHG | SYSTOLIC BLOOD PRESSURE: 160 MMHG | TEMPERATURE: 99.5 F | WEIGHT: 312.5 LBS | HEIGHT: 71 IN | HEART RATE: 80 BPM | OXYGEN SATURATION: 99 % | RESPIRATION RATE: 20 BRPM

## 2025-03-16 DIAGNOSIS — K04.7 DENTAL ABSCESS: Primary | ICD-10-CM

## 2025-03-16 PROCEDURE — 41800 DRAINAGE OF GUM LESION: CPT

## 2025-03-16 PROCEDURE — 99283 EMERGENCY DEPT VISIT LOW MDM: CPT

## 2025-03-16 PROCEDURE — 6370000000 HC RX 637 (ALT 250 FOR IP): Performed by: EMERGENCY MEDICINE

## 2025-03-16 RX ORDER — OXYCODONE HYDROCHLORIDE 5 MG/1
5 TABLET ORAL ONCE
Refills: 0 | Status: COMPLETED | OUTPATIENT
Start: 2025-03-16 | End: 2025-03-16

## 2025-03-16 RX ORDER — IBUPROFEN 800 MG/1
800 TABLET, FILM COATED ORAL EVERY 8 HOURS PRN
Qty: 180 TABLET | Refills: 1 | Status: SHIPPED | OUTPATIENT
Start: 2025-03-16

## 2025-03-16 RX ORDER — CHLORHEXIDINE GLUCONATE ORAL RINSE 1.2 MG/ML
15 SOLUTION DENTAL 2 TIMES DAILY
Qty: 420 ML | Refills: 0 | Status: SHIPPED | OUTPATIENT
Start: 2025-03-16 | End: 2025-03-30

## 2025-03-16 RX ADMIN — OXYCODONE 5 MG: 5 TABLET ORAL at 05:07

## 2025-03-16 RX ADMIN — AMOXICILLIN AND CLAVULANATE POTASSIUM 1 TABLET: 875; 125 TABLET, FILM COATED ORAL at 05:07

## 2025-03-16 ASSESSMENT — PAIN DESCRIPTION - DESCRIPTORS: DESCRIPTORS: ACHING

## 2025-03-16 ASSESSMENT — PAIN - FUNCTIONAL ASSESSMENT: PAIN_FUNCTIONAL_ASSESSMENT: ACTIVITIES ARE NOT PREVENTED

## 2025-03-16 ASSESSMENT — PAIN SCALES - GENERAL
PAINLEVEL_OUTOF10: 10
PAINLEVEL_OUTOF10: 10

## 2025-03-16 ASSESSMENT — PAIN DESCRIPTION - FREQUENCY: FREQUENCY: CONTINUOUS

## 2025-03-16 ASSESSMENT — PAIN DESCRIPTION - LOCATION
LOCATION: TEETH
LOCATION: FACE

## 2025-03-16 ASSESSMENT — PAIN DESCRIPTION - ORIENTATION: ORIENTATION: RIGHT;UPPER

## 2025-03-16 NOTE — DISCHARGE INSTRUCTIONS
income (example: pay stub or tax return). Scale is based on family size and income. Discounts can be 25%, 50%, 75%, or 100% of all services.   Medicaid, Self-pay    [facility] [address] [id number] [phone]    Monday-Thursday 10:30am - 7:00pm Friday 8:30am - 5:00pm   Appointment Only   No residency requirements   Sliding Fee Scale  *Scale requires proof of income (example: pay stub or tax return). Scale is based on family size and income. Discounts can be 25%, 50%, 75%, or 100% of all services.   Medicaid, Self-pay

## 2025-03-16 NOTE — ED PROVIDER NOTES
sclera are clear, oropharynx is nonerythematous, mucus membranes are moist, around tooth #3 he does have what appears to be a periapical abscess  He has no facial asymmetry, swelling or erythema, painful to open his mouth but no significant trismus  Neck: Trachea midline  Chest: Nonlabored respirations, clear to auscultation bilaterally  Cardiovascular: Regular rate and rhythm, 2+ radial pulses bilaterally  Abdominal: Nondistended abdomen, soft, nontender without rebound or guarding  Skin: Warm, dry well perfused, no rashes  Extremities: no obvious deformities, no tenderness to palpation diffusely  Neurologic:  Alert and oriented, speech is clear and intact without dysarthria, gait is intact  Psychologic: appropriate mood and affect                Bryan Reed MD  03/16/25 8016

## (undated) DEVICE — TUBING PMP L16FT MAIN DISP FOR AR-6400 AR-6475

## (undated) DEVICE — SUTURE VCRL SZ 3-0 L27IN ABSRB UD L26MM CT-2 1/2 CIR J232H

## (undated) DEVICE — KIT ARTHSCP ROT CUF REP FOR 2.9MM W/ MTL SPEAR AND DRL

## (undated) DEVICE — GOWN,SIRUS,POLYRNF,BRTHSLV,XL,30/CS: Brand: MEDLINE

## (undated) DEVICE — PROBE ABLAT XL 90DEG ASPIR BPLR RF 1 PC ELECTRD ERGO HNDL

## (undated) DEVICE — SUTURE MCRYL SZ 4-0 L27IN ABSRB UD L19MM PS-2 1/2 CIR PRIM Y426H

## (undated) DEVICE — BUR SHAVER 5 MMX13 CM 8 FLUT OVL FOR AGGRESSIVE BNE COOLCUT

## (undated) DEVICE — ADAPTER SUCTION ARTHRO-FLO HYDRFLX DUAL

## (undated) DEVICE — SOLUTION IV 1000ML 0.9% SOD CHL

## (undated) DEVICE — UNDERGLOVE SURG SZ 8 BLU LTX FREE SYN POLYISOPRENE POLYMER

## (undated) DEVICE — NEEDLE SUT PASS FOR ROT CUF LABRAL REP SUREFIRE SCORPION

## (undated) DEVICE — TUBING FLD MGMT Y DBL SPIK DUALWAVE

## (undated) DEVICE — SYSTEM SKIN CLSR 22CM DERMBND PRINEO

## (undated) DEVICE — GOWN,REINFRCE,POLY,ECLIPSE,SLV,3XLG: Brand: MEDLINE

## (undated) DEVICE — TUBING, SUCTION, 1/4" X 12', STRAIGHT: Brand: MEDLINE

## (undated) DEVICE — MASTISOL ADHESIVE LIQ 2/3ML

## (undated) DEVICE — SUTURE FIBERWIRE SZ 2 W/ TAPERED NEEDLE BLUE L38IN NONABSORB BLU L26.5MM 1/2 CIRCLE AR7200

## (undated) DEVICE — STRAP POS W5XL72IN FOAM KNEE AND BODY HK LOOP CLSR DISP

## (undated) DEVICE — SYRINGE IRRIG 60ML SFT PLIABLE BLB EZ TO GRP 1 HND USE W/

## (undated) DEVICE — CANNULA ARTHSCP L7CM DIA7MM TRNSLUC THRD FLX W/ NO SQUIRT

## (undated) DEVICE — SUTURE PDS II SZ 1 L27IN ABSRB VLT CT-1 L36MM 1/2 CIR Z341H

## (undated) DEVICE — INTENDED FOR TISSUE SEPARATION, AND OTHER PROCEDURES THAT REQUIRE A SHARP SURGICAL BLADE TO PUNCTURE OR CUT.: Brand: BARD-PARKER ® CARBON RIB-BACK BLADES

## (undated) DEVICE — 3M™ IOBAN™ 2 ANTIMICROBIAL INCISE DRAPE 6640EZ: Brand: IOBAN™ 2

## (undated) DEVICE — SUTURE MCRYL SZ 4-0 L18IN ABSRB UD L19MM PS-2 3/8 CIR PRIM Y496G

## (undated) DEVICE — SLEEVE TRAC SPANDEX LAT W/ 4IN COBAN SUPERFICIAL RAD NRV PD

## (undated) DEVICE — 90-S CRUISE, SUCTION PROBE, NON-BENDABLE, MAX CUT LEVEL 1: Brand: SERFAS ENERGY

## (undated) DEVICE — KIT INSRT PERC SPEAR DIL NEEDLE W/ STYL GUIDEPIN STP DRL FOR

## (undated) DEVICE — TOWEL,STOP FLAG GOLD N-W: Brand: MEDLINE

## (undated) DEVICE — DISCONTINUED NO SUB IDED TG GLOVE SURG SENSICARE ALOE LT LF PF ST GRN SZ 8

## (undated) DEVICE — CANNULA ARTHSCP L9CM DIA8.25MM TRNSLUC THRD FLX W/ NO

## (undated) DEVICE — BLADE SHV L13CM DIA4MM EXCALIBUR AGG COOLCUT

## (undated) DEVICE — SHOULDER ARTHROSCOPY: Brand: MEDLINE INDUSTRIES, INC.

## (undated) DEVICE — PUDDLEVAC FLOOR SUCTION DEVICE: Brand: PUDDLEVAC

## (undated) DEVICE — SUTURE VCRL SZ 2-0 L27IN ABSRB UD L26MM CT-2 1/2 CIR J269H

## (undated) DEVICE — SLING TRAC LAT CUSH DISP

## (undated) DEVICE — SHEET,DRAPE,53X77,STERILE: Brand: MEDLINE

## (undated) DEVICE — SHOULDER CANNULA SET WITHOUT FENESTRATIONS, 5.5 MM (I.D.) X 70 MM: Brand: CONMED

## (undated) DEVICE — TUBING PMP L6FT CONT WAVE EXTN

## (undated) DEVICE — MAT FLR W32XL58IN

## (undated) DEVICE — SOLUTION IV IRRIG LACTATED RINGERS 3000ML 2B7487

## (undated) DEVICE — GAUZE,SPONGE,4"X4",16PLY,XRAY,STRL,LF: Brand: MEDLINE

## (undated) DEVICE — GLOVE ORANGE PI 8   MSG9080

## (undated) DEVICE — PACK SURG PROC SHLDR ARTHRO ST ORTHOARTS

## (undated) DEVICE — COVER,TABLE,HEAVY DUTY,77"X90",STRL: Brand: MEDLINE

## (undated) DEVICE — YANKAUER,OPEN TIP,W/O VENT,STERILE: Brand: MEDLINE INDUSTRIES, INC.

## (undated) DEVICE — BLADE SHV L13CM DIA5MM EXCALIBUR AGG COOLCUT

## (undated) DEVICE — SPONGE GZ W4XL8IN COT WVN 12 PLY